# Patient Record
Sex: FEMALE | Race: BLACK OR AFRICAN AMERICAN | Employment: FULL TIME | URBAN - METROPOLITAN AREA
[De-identification: names, ages, dates, MRNs, and addresses within clinical notes are randomized per-mention and may not be internally consistent; named-entity substitution may affect disease eponyms.]

---

## 2017-03-21 PROBLEM — S93.412A SPRAIN OF CALCANEOFIBULAR LIGAMENT OF LEFT ANKLE: Status: ACTIVE | Noted: 2017-03-21

## 2018-06-26 ENCOUNTER — HOSPITAL ENCOUNTER (OUTPATIENT)
Dept: DIABETES SERVICES | Age: 58
Setting detail: THERAPIES SERIES
Discharge: HOME | End: 2018-06-26
Payer: COMMERCIAL

## 2018-06-26 PROCEDURE — G0109 DIAB MANAGE TRN IND/GROUP: HCPCS

## 2018-06-27 ENCOUNTER — HOSPITAL ENCOUNTER (OUTPATIENT)
Dept: DIABETES SERVICES | Age: 58
Setting detail: THERAPIES SERIES
Discharge: HOME | End: 2018-06-27
Payer: COMMERCIAL

## 2018-06-27 PROCEDURE — G0109 DIAB MANAGE TRN IND/GROUP: HCPCS

## 2018-06-28 ENCOUNTER — HOSPITAL ENCOUNTER (OUTPATIENT)
Dept: DIABETES SERVICES | Age: 58
Setting detail: THERAPIES SERIES
Discharge: HOME | End: 2018-06-28
Payer: COMMERCIAL

## 2018-06-28 PROCEDURE — 97804 MEDICAL NUTRITION GROUP: CPT

## 2018-06-28 ASSESSMENT — PATIENT HEALTH QUESTIONNAIRE - PHQ9
7. TROUBLE CONCENTRATING ON THINGS, SUCH AS READING THE NEWSPAPER OR WATCHING TELEVISION: 0
1. LITTLE INTEREST OR PLEASURE IN DOING THINGS: 0
5. POOR APPETITE OR OVEREATING: 3
3. TROUBLE FALLING OR STAYING ASLEEP: 0
SUM OF ALL RESPONSES TO PHQ9 QUESTIONS 1 & 2: 0
6. FEELING BAD ABOUT YOURSELF - OR THAT YOU ARE A FAILURE OR HAVE LET YOURSELF OR YOUR FAMILY DOWN: 0
SUM OF ALL RESPONSES TO PHQ QUESTIONS 1-9: 5
4. FEELING TIRED OR HAVING LITTLE ENERGY: 2
9. THOUGHTS THAT YOU WOULD BE BETTER OFF DEAD, OR OF HURTING YOURSELF: 0
10. IF YOU CHECKED OFF ANY PROBLEMS, HOW DIFFICULT HAVE THESE PROBLEMS MADE IT FOR YOU TO DO YOUR WORK, TAKE CARE OF THINGS AT HOME, OR GET ALONG WITH OTHER PEOPLE: 0
2. FEELING DOWN, DEPRESSED OR HOPELESS: 0
8. MOVING OR SPEAKING SO SLOWLY THAT OTHER PEOPLE COULD HAVE NOTICED. OR THE OPPOSITE, BEING SO FIGETY OR RESTLESS THAT YOU HAVE BEEN MOVING AROUND A LOT MORE THAN USUAL: 0

## 2018-07-07 ENCOUNTER — HOSPITAL ENCOUNTER (OUTPATIENT)
Age: 58
Discharge: HOME | End: 2018-07-07
Payer: COMMERCIAL

## 2018-07-07 LAB
ALBUMIN SERPL-MCNC: 4.1 G/DL (ref 3.5–5.2)
ALP BLD-CCNC: 43 U/L (ref 35–104)
ALT SERPL-CCNC: 9 U/L (ref 0–32)
ANION GAP SERPL CALCULATED.3IONS-SCNC: 13 MMOL/L (ref 7–16)
AST SERPL-CCNC: 11 U/L (ref 0–31)
BASOPHILS ABSOLUTE: 0.02 E9/L (ref 0–0.2)
BASOPHILS RELATIVE PERCENT: 0.7 % (ref 0–2)
BILIRUB SERPL-MCNC: 1 MG/DL (ref 0–1.2)
BUN BLDV-MCNC: 14 MG/DL (ref 6–20)
CALCIUM SERPL-MCNC: 9.3 MG/DL (ref 8.6–10.2)
CHLORIDE BLD-SCNC: 97 MMOL/L (ref 98–107)
CHOLESTEROL, TOTAL: 181 MG/DL (ref 0–199)
CO2: 26 MMOL/L (ref 22–29)
CREAT SERPL-MCNC: 0.7 MG/DL (ref 0.5–1)
EOSINOPHILS ABSOLUTE: 0.03 E9/L (ref 0.05–0.5)
EOSINOPHILS RELATIVE PERCENT: 1.1 % (ref 0–6)
GFR AFRICAN AMERICAN: >60
GFR NON-AFRICAN AMERICAN: >60 ML/MIN/1.73
GLUCOSE BLD-MCNC: 235 MG/DL (ref 74–109)
HCT VFR BLD CALC: 36.1 % (ref 34–48)
HDLC SERPL-MCNC: 55 MG/DL
HEMOGLOBIN: 11.5 G/DL (ref 11.5–15.5)
IMMATURE GRANULOCYTES #: 0.01 E9/L
IMMATURE GRANULOCYTES %: 0.4 % (ref 0–5)
LDL CHOLESTEROL CALCULATED: 112 MG/DL (ref 0–99)
LYMPHOCYTES ABSOLUTE: 0.78 E9/L (ref 1.5–4)
LYMPHOCYTES RELATIVE PERCENT: 28.6 % (ref 20–42)
MCH RBC QN AUTO: 30.9 PG (ref 26–35)
MCHC RBC AUTO-ENTMCNC: 31.9 % (ref 32–34.5)
MCV RBC AUTO: 97 FL (ref 80–99.9)
MONOCYTES ABSOLUTE: 0.22 E9/L (ref 0.1–0.95)
MONOCYTES RELATIVE PERCENT: 8.1 % (ref 2–12)
NEUTROPHILS ABSOLUTE: 1.67 E9/L (ref 1.8–7.3)
NEUTROPHILS RELATIVE PERCENT: 61.1 % (ref 43–80)
PDW BLD-RTO: 12.2 FL (ref 11.5–15)
PLATELET # BLD: 241 E9/L (ref 130–450)
PMV BLD AUTO: 10.6 FL (ref 7–12)
POTASSIUM SERPL-SCNC: 4 MMOL/L (ref 3.5–5)
RBC # BLD: 3.72 E12/L (ref 3.5–5.5)
SODIUM BLD-SCNC: 136 MMOL/L (ref 132–146)
TOTAL PROTEIN: 7 G/DL (ref 6.4–8.3)
TRIGL SERPL-MCNC: 70 MG/DL (ref 0–149)
TSH SERPL DL<=0.05 MIU/L-ACNC: 0.85 UIU/ML (ref 0.27–4.2)
VITAMIN D 25-HYDROXY: 42 NG/ML (ref 30–100)
VLDLC SERPL CALC-MCNC: 14 MG/DL
WBC # BLD: 2.7 E9/L (ref 4.5–11.5)

## 2018-07-07 PROCEDURE — 82306 VITAMIN D 25 HYDROXY: CPT

## 2018-07-07 PROCEDURE — 36415 COLL VENOUS BLD VENIPUNCTURE: CPT

## 2018-07-07 PROCEDURE — 80061 LIPID PANEL: CPT

## 2018-07-07 PROCEDURE — 80053 COMPREHEN METABOLIC PANEL: CPT

## 2018-07-07 PROCEDURE — 85025 COMPLETE CBC W/AUTO DIFF WBC: CPT

## 2018-07-07 PROCEDURE — 84443 ASSAY THYROID STIM HORMONE: CPT

## 2018-08-20 ENCOUNTER — OFFICE VISIT (OUTPATIENT)
Dept: ENDOCRINOLOGY | Age: 58
End: 2018-08-20
Payer: COMMERCIAL

## 2018-08-20 VITALS
HEART RATE: 74 BPM | DIASTOLIC BLOOD PRESSURE: 60 MMHG | WEIGHT: 141.38 LBS | BODY MASS INDEX: 20.94 KG/M2 | SYSTOLIC BLOOD PRESSURE: 120 MMHG | TEMPERATURE: 97.8 F | HEIGHT: 69 IN | OXYGEN SATURATION: 99 % | RESPIRATION RATE: 16 BRPM

## 2018-08-20 DIAGNOSIS — E11.8 TYPE 2 DIABETES MELLITUS WITH COMPLICATION, UNSPECIFIED WHETHER LONG TERM INSULIN USE: ICD-10-CM

## 2018-08-20 DIAGNOSIS — E11.8 TYPE 2 DIABETES MELLITUS WITH COMPLICATION, WITHOUT LONG-TERM CURRENT USE OF INSULIN (HCC): ICD-10-CM

## 2018-08-20 LAB — HBA1C MFR BLD: 11.4 %

## 2018-08-20 PROCEDURE — 99203 OFFICE O/P NEW LOW 30 MIN: CPT | Performed by: INTERNAL MEDICINE

## 2018-08-20 PROCEDURE — 83036 HEMOGLOBIN GLYCOSYLATED A1C: CPT | Performed by: INTERNAL MEDICINE

## 2018-08-20 RX ORDER — GLUCOSAMINE HCL/CHONDROITIN SU 500-400 MG
CAPSULE ORAL
Qty: 200 STRIP | Refills: 5 | Status: SHIPPED | OUTPATIENT
Start: 2018-08-20 | End: 2018-12-31

## 2018-08-20 RX ORDER — GLIMEPIRIDE 2 MG/1
2 TABLET ORAL 2 TIMES DAILY
Qty: 180 TABLET | Refills: 5 | Status: SHIPPED | OUTPATIENT
Start: 2018-08-20 | End: 2019-08-26

## 2018-08-20 RX ORDER — METFORMIN HYDROCHLORIDE 500 MG/1
500 TABLET, FILM COATED, EXTENDED RELEASE ORAL 2 TIMES DAILY WITH MEALS
Qty: 180 TABLET | Refills: 3 | Status: SHIPPED | OUTPATIENT
Start: 2018-08-20 | End: 2018-08-24 | Stop reason: CLARIF

## 2018-08-20 NOTE — PROGRESS NOTES
on file. Social History Main Topics    Smoking status: Never Smoker    Smokeless tobacco: Never Used    Alcohol use 0.0 oz/week      Comment: social    Drug use: No    Sexual activity: Not on file     Other Topics Concern    Not on file     Social History Narrative    No narrative on file     FAMILY HISTORY   Family History   Problem Relation Age of Onset    Thyroid Disease Mother     High Blood Pressure Mother     Arthritis Mother     High Blood Pressure Father     High Blood Pressure Sister     Diabetes Maternal Uncle     Diabetes Paternal Aunt     Diabetes Maternal Grandmother     Cancer Maternal Grandfather     Thyroid Disease Sister      ALLERGIES AND DRUG REACTIONS   Allergies   Allergen Reactions    Sulfa Antibiotics Itching       CURRENT MEDICATIONS     Current Outpatient Prescriptions   Medication Sig Dispense Refill    SITagliptin (JANUVIA) 100 MG tablet Take 1 tablet by mouth daily 90 tablet 5    glimepiride (AMARYL) 2 MG tablet Take 1 tablet by mouth 2 times daily 180 tablet 5    blood glucose monitor strips Test 2 times a day & as needed for symptoms of irregular blood glucose. 200 strip 5    levETIRAcetam (KEPPRA) 750 MG tablet Take 1 tablet by mouth 2 times daily 180 tablet 2    Cyanocobalamin (VITAMIN B-12) 2000 MCG TBCR Take 1 tablet by mouth daily       vitamin D (ERGOCALCIFEROL) 73783 UNITS CAPS capsule Take 50,000 Units by mouth once a week Takes on fridays      GLUMETZA 1000 MG ER tablet TAKE 1 TABLET TWICE A DAY WITH MEALS 180 tablet 0    glimepiride (AMARYL) 2 MG tablet Take 2 tablets twice daily 180 tablet 0    naproxen (NAPROSYN) 500 MG tablet Take 1 tablet by mouth 2 times daily 60 tablet 0    CLIMARA 0.075 MG/24HR Place 1 patch onto the skin once a week Applies on wednesdays      ibuprofen (ADVIL;MOTRIN) 800 MG tablet Take 1 tablet by mouth every 6 hours as needed for Pain 20 tablet 3     No current facility-administered medications for this visit. eating consistent carb diet to avoid blood sugar fluctuations   · Advised to check blood sugars 2 times a day before meals and fax the results to our office in few days   · Patient up to date with the routine diabetes maintenance and prevention  · Discussed lifestyle changes including diet and exercise with patient; recommended 150 minutes of moderate intensity exercise per week. · Diabetes labs before next visit     Follow up  · 3 months     The above issues were reviewed with the patient who understood and agreed with the plan. 30 minutes were spent today in management of this patient. More than 50% of time spent on counseling of patient on above diagnosis. Thank you for allowing us to participate in the care of this patient. Please do not hesitate to contact us with any additional questions.      Danni Gamino MD  Endocrinologist, [address] [address]   Phone: [phone]  Fax: [phone]  --------------------------------------------  Electronically signed

## 2018-08-20 NOTE — PATIENT INSTRUCTIONS
Recommendations for today's visit  · Continue current dose of Metformin Januvia and Glimepiride   · Start Basaglar 8 units daily   · Check blood sugar twice a day and send us sugar log later this week     I you have any questions please call Dr. Tyler Vargas office     Macrina Ulrich MD  Endocrinologist, [facility])   [address] [facility], [address] [place] [address],[facility] [id number]   Phone: [phone]  Fax: [phone]

## 2018-08-24 DIAGNOSIS — E11.8 TYPE 2 DIABETES MELLITUS WITH COMPLICATION, WITHOUT LONG-TERM CURRENT USE OF INSULIN (HCC): Primary | ICD-10-CM

## 2018-10-11 ENCOUNTER — TELEPHONE (OUTPATIENT)
Dept: ENDOCRINOLOGY | Age: 58
End: 2018-10-11

## 2018-11-17 ENCOUNTER — HOSPITAL ENCOUNTER (OUTPATIENT)
Age: 58
Discharge: HOME | End: 2018-11-17
Payer: COMMERCIAL

## 2018-11-17 DIAGNOSIS — E11.8 TYPE 2 DIABETES MELLITUS WITH COMPLICATION, UNSPECIFIED WHETHER LONG TERM INSULIN USE: ICD-10-CM

## 2018-11-17 LAB
ANION GAP SERPL CALCULATED.3IONS-SCNC: 13 MMOL/L (ref 7–16)
BUN BLDV-MCNC: 14 MG/DL (ref 6–20)
CALCIUM SERPL-MCNC: 8.9 MG/DL (ref 8.6–10.2)
CHLORIDE BLD-SCNC: 101 MMOL/L (ref 98–107)
CO2: 26 MMOL/L (ref 22–29)
CREAT SERPL-MCNC: 0.6 MG/DL (ref 0.5–1)
CREATININE URINE: 150 MG/DL (ref 29–226)
GFR AFRICAN AMERICAN: >60
GFR NON-AFRICAN AMERICAN: >60 ML/MIN/1.73
GLUCOSE BLD-MCNC: 181 MG/DL (ref 74–99)
HBA1C MFR BLD: 9.2 % (ref 4–5.6)
MICROALBUMIN UR-MCNC: <12 MG/L
MICROALBUMIN/CREAT UR-RTO: ABNORMAL (ref 0–30)
POTASSIUM SERPL-SCNC: 4 MMOL/L (ref 3.5–5)
SODIUM BLD-SCNC: 140 MMOL/L (ref 132–146)

## 2018-11-17 PROCEDURE — 83036 HEMOGLOBIN GLYCOSYLATED A1C: CPT

## 2018-11-17 PROCEDURE — 82044 UR ALBUMIN SEMIQUANTITATIVE: CPT

## 2018-11-17 PROCEDURE — 82570 ASSAY OF URINE CREATININE: CPT

## 2018-11-17 PROCEDURE — 36415 COLL VENOUS BLD VENIPUNCTURE: CPT

## 2018-11-17 PROCEDURE — 80048 BASIC METABOLIC PNL TOTAL CA: CPT

## 2018-12-31 ENCOUNTER — OFFICE VISIT (OUTPATIENT)
Dept: ENDOCRINOLOGY | Age: 58
End: 2018-12-31
Payer: COMMERCIAL

## 2018-12-31 VITALS
BODY MASS INDEX: 22.51 KG/M2 | RESPIRATION RATE: 16 BRPM | OXYGEN SATURATION: 98 % | DIASTOLIC BLOOD PRESSURE: 80 MMHG | WEIGHT: 152 LBS | SYSTOLIC BLOOD PRESSURE: 148 MMHG | HEIGHT: 69 IN | HEART RATE: 85 BPM

## 2018-12-31 DIAGNOSIS — E11.9 TYPE 2 DIABETES MELLITUS WITHOUT COMPLICATION, UNSPECIFIED WHETHER LONG TERM INSULIN USE (HCC): Primary | ICD-10-CM

## 2018-12-31 PROCEDURE — 99214 OFFICE O/P EST MOD 30 MIN: CPT | Performed by: INTERNAL MEDICINE

## 2018-12-31 RX ORDER — GLUCOSAMINE HCL/CHONDROITIN SU 500-400 MG
CAPSULE ORAL
Qty: 200 STRIP | Refills: 5 | Status: SHIPPED | OUTPATIENT
Start: 2018-12-31 | End: 2019-03-01 | Stop reason: SDUPTHER

## 2018-12-31 NOTE — LETTER
[address] [place] [address] Department of Endocrinology Diabetes and Metabolism       Provider: Eileen Dangelo MD  [address] [facility], [address] [facility],[address] [address]   Phone: [phone]  Fax: [phone]    Primary Care Physician: Kaylah Melo MD   Referring Provider: No ref. provider found    Patient: Ebony Oneal  YOB: 1960  Date of Visit: 12/31/2018      Dear Dr. Kaylah Melo MD   I had the pleasure of seeing your patient Ebony Oneal today at endocrine clinic for follow up visit and I enclosed a copy of the office visit completed today. Thank you very much for asking us to participate in the care of this very pleasant patient. Please don't hesitate to call if there are any further questions or concerns. Sincerely   Eileen Dangelo MD  Endocrinologist, [facility]   [address] [facility], [address] [facility],[address] [address]   Phone: [phone]  Fax: [phone]      ENDOCRINOLOGY CLINIC NOTE    Date of Service: 12/31/2018    Medical Records Reviewed:   Inpatient records, outpatient records, outside records     Care Team:  Primary Care Physician: Kaylah Melo MD.  Provider: Eileen Dangelo MD  Other provider(s):            Reason for the visit:  Uncontrolled type II DM      Type of Visit:  Follow up     History of Present Illness: The history is provided by the patient. No  was used. Accuracy of the patient data is excellent. Ebony Oneal is a very pleasant 60 y.o. female seen in Endocrine clinic today for diabetes management     Ebony Oneal was diagnosed with diabetes at age 47   Currently on Januvia 100 mg daily, Metformin 500 mg BID, Glimepiride 2 mg BID, basaglar 8 units daily    The patient has been checking blood sugar daily in the morning. Usual readings in the morning 200s   A1c now 9.2 down from 11.2%  Checks BS once a day in the morning.  Readings usally 1120-180   Patient has had no hypoglycemic episodes Adult)   Pulse 85   Resp 16   Ht 5' 9\" (1.753 m)   Wt 152 lb (68.9 kg)   SpO2 98%   BMI 22.45 kg/m²    BP Readings from Last 4 Encounters:   12/31/18 (!) 148/80   08/20/18 120/60   04/11/17 (!) 115/58   03/21/17 (!) 95/58     Wt Readings from Last 6 Encounters:   12/31/18 152 lb (68.9 kg)   08/20/18 141 lb 6 oz (64.1 kg)   11/02/17 154 lb (69.9 kg)   05/01/17 157 lb (71.2 kg)   04/11/17 155 lb (70.3 kg)   03/21/17 155 lb (70.3 kg)       Physical examination:  General: awake alert, oriented x3, no abnormal position or movements. HEENT: normocephalic non-traumatic, no exophthalmos   Neck: supple, no LN enlargement, no thyromegaly, no thyroid tenderness, no JVD. Pulm: Clear equal air entry no added sounds, no wheezing or rhonchi    CVS: S1 + S2, no murmur, no heave. Dorsalis pedis pulse palpable   Abd: soft lax, no tenderness, no organomegaly, audible bowel sounds. Skin: warm, no lesions, no rash.  No callus, no Ulcers, No acanthosis nigricans   Neuro: CN intact, Monofilament sensation decreased bilateral , muscle power normal  Psych: normal mood, and affect      Review of Laboratory Data:  I have reviewed the following:  Lab Results   Component Value Date/Time    WBC 2.7 (L) 07/07/2018 10:51 AM    RBC 3.72 07/07/2018 10:51 AM    HGB 11.5 07/07/2018 10:51 AM    HCT 36.1 07/07/2018 10:51 AM    MCV 97.0 07/07/2018 10:51 AM    MCH 30.9 07/07/2018 10:51 AM    MCHC 31.9 (L) 07/07/2018 10:51 AM    RDW 12.2 07/07/2018 10:51 AM     07/07/2018 10:51 AM    MPV 10.6 07/07/2018 10:51 AM      Lab Results   Component Value Date/Time     11/17/2018 09:55 AM    K 4.0 11/17/2018 09:55 AM    CO2 26 11/17/2018 09:55 AM    BUN 14 11/17/2018 09:55 AM    CALCIUM 8.9 11/17/2018 09:55 AM      Lab Results   Component Value Date    LABA1C 9.2 11/17/2018    GLUCOSE 181 11/17/2018    MALBCR - 11/17/2018    LABMICR <12.0 11/17/2018    LABCREA 150 11/17/2018     Lab Results   Component Value Date    CHOL 181 07/07/2018 CHOL 142 01/23/2017    CHOL 163 07/15/2016    CHOL 167 10/03/2015    TRIG 70 07/07/2018    TRIG 60 01/23/2017    TRIG 54 07/15/2016    TRIG 66 10/03/2015    HDL 55 07/07/2018    HDL 48 01/23/2017    HDL 60 07/15/2016    HDL 54 10/03/2015     Lab Results   Component Value Date    VITD25 42 07/07/2018    VITD25 36 01/23/2017    VITD25 40 07/15/2016    VITD25 20 10/03/2015       All labs medical records and images were reviewed independently     Additional Data Reviewed: Individual visualization of point-of-care blood glucose levels and medications doses    ASSESSMENT & RECOMMENDATIONS   Manju Santana, a 60 y.o.-old female seen in for the following issues     Diabetes Mellitus Type 2    · Improving control but still above goal   · Will increase Metformin to 1000 mg BID  · Continue Januvia 100 mg daily, Glimepiride 2 mg BID, basaglar 8 units daily  · Advised to check blood sugars 2-3 times a day before meals and fax the results to our office in a wk. At that time will likely increase Glimepiride dose and consider stopping Basaglar   · Discussed with patient A1c and blood sugar goals   · Optimal blood sugars: 100-140 pre-prandial, < 180 peak post-prandial  · The patient counseled about the complications of uncontrolled diabetes   · Patient was counselled about the importance of self-blood glucose monitoring and eating consistent carb diet to avoid blood sugar fluctuations   · Patient up to date with the routine diabetes maintenance and prevention  · Discussed lifestyle changes including diet and exercise with patient; recommended 150 minutes of moderate intensity exercise per week. · Diabetes labs before next visit     Follow up  · 4 months     The above issues were reviewed with the patient who understood and agreed with the plan. 30 minutes were spent today in management of this patient. More than 50% of time spent on counseling of patient on above diagnosis.

## 2019-03-01 RX ORDER — GLUCOSAMINE HCL/CHONDROITIN SU 500-400 MG
CAPSULE ORAL
Qty: 200 STRIP | Refills: 5 | Status: SHIPPED | OUTPATIENT
Start: 2019-03-01

## 2019-04-27 ENCOUNTER — HOSPITAL ENCOUNTER (OUTPATIENT)
Age: 59
Discharge: HOME | End: 2019-04-27
Payer: COMMERCIAL

## 2019-04-27 DIAGNOSIS — E11.9 TYPE 2 DIABETES MELLITUS WITHOUT COMPLICATION, UNSPECIFIED WHETHER LONG TERM INSULIN USE (HCC): ICD-10-CM

## 2019-04-27 LAB
ANION GAP SERPL CALCULATED.3IONS-SCNC: 11 MMOL/L (ref 7–16)
BUN BLDV-MCNC: 10 MG/DL (ref 6–20)
CALCIUM SERPL-MCNC: 9.5 MG/DL (ref 8.6–10.2)
CHLORIDE BLD-SCNC: 104 MMOL/L (ref 98–107)
CHOLESTEROL, TOTAL: 156 MG/DL (ref 0–199)
CO2: 28 MMOL/L (ref 22–29)
CREAT SERPL-MCNC: 0.7 MG/DL (ref 0.5–1)
GFR AFRICAN AMERICAN: >60
GFR NON-AFRICAN AMERICAN: >60 ML/MIN/1.73
GLUCOSE BLD-MCNC: 75 MG/DL (ref 74–99)
HBA1C MFR BLD: 7.6 % (ref 4–5.6)
HDLC SERPL-MCNC: 63 MG/DL
LDL CHOLESTEROL CALCULATED: 84 MG/DL (ref 0–99)
MICROALBUMIN UR-MCNC: <12 MG/L
POTASSIUM SERPL-SCNC: 3.9 MMOL/L (ref 3.5–5)
SODIUM BLD-SCNC: 143 MMOL/L (ref 132–146)
TRIGL SERPL-MCNC: 46 MG/DL (ref 0–149)
VLDLC SERPL CALC-MCNC: 9 MG/DL

## 2019-04-27 PROCEDURE — 80048 BASIC METABOLIC PNL TOTAL CA: CPT

## 2019-04-27 PROCEDURE — 83036 HEMOGLOBIN GLYCOSYLATED A1C: CPT

## 2019-04-27 PROCEDURE — 80061 LIPID PANEL: CPT

## 2019-04-27 PROCEDURE — 82044 UR ALBUMIN SEMIQUANTITATIVE: CPT

## 2019-04-27 PROCEDURE — 36415 COLL VENOUS BLD VENIPUNCTURE: CPT

## 2019-04-29 ENCOUNTER — OFFICE VISIT (OUTPATIENT)
Dept: ENDOCRINOLOGY | Age: 59
End: 2019-04-29
Payer: COMMERCIAL

## 2019-04-29 VITALS
OXYGEN SATURATION: 98 % | BODY MASS INDEX: 24.41 KG/M2 | HEIGHT: 69 IN | WEIGHT: 164.8 LBS | SYSTOLIC BLOOD PRESSURE: 118 MMHG | DIASTOLIC BLOOD PRESSURE: 78 MMHG | HEART RATE: 76 BPM | RESPIRATION RATE: 16 BRPM

## 2019-04-29 DIAGNOSIS — E11.9 TYPE 2 DIABETES MELLITUS WITHOUT COMPLICATION, UNSPECIFIED WHETHER LONG TERM INSULIN USE (HCC): Primary | ICD-10-CM

## 2019-04-29 DIAGNOSIS — E55.9 VITAMIN D DEFICIENCY: ICD-10-CM

## 2019-04-29 PROCEDURE — 83036 HEMOGLOBIN GLYCOSYLATED A1C: CPT | Performed by: INTERNAL MEDICINE

## 2019-04-29 PROCEDURE — 99214 OFFICE O/P EST MOD 30 MIN: CPT | Performed by: INTERNAL MEDICINE

## 2019-04-29 RX ORDER — ERGOCALCIFEROL 1.25 MG/1
50000 CAPSULE ORAL WEEKLY
Qty: 5 CAPSULE | Refills: 5 | Status: SHIPPED | OUTPATIENT
Start: 2019-04-29 | End: 2019-04-29 | Stop reason: SDUPTHER

## 2019-04-29 RX ORDER — ERGOCALCIFEROL 1.25 MG/1
50000 CAPSULE ORAL WEEKLY
Qty: 12 CAPSULE | Refills: 5 | Status: SHIPPED | OUTPATIENT
Start: 2019-04-29 | End: 2020-01-06 | Stop reason: SDUPTHER

## 2019-04-29 NOTE — LETTER
[address] [place] [address] Department of Endocrinology Diabetes and Metabolism       Provider: Rina Snowden MD  [address] [facility], [address] [facility],[address] [address]   Phone: [phone]  Fax: [phone]    Primary Care Physician: Tito Lama MD   Referring Provider: No ref. provider found    Patient: Silvia Beth  YOB: 1960  Date of Visit: 4/29/2019      Dear Dr. Tito Lama MD   I had the pleasure of seeing your patient Silvia Beth today at endocrine clinic for follow up visit and I enclosed a copy of the office visit completed today. Thank you very much for asking us to participate in the care of this very pleasant patient. Please don't hesitate to call if there are any further questions or concerns. Sincerely   Rina Snowden MD  Endocrinologist, [facility])   [address] [facility], [address] [facility],[address] [address]   Phone: [phone]  Fax: [phone]      ENDOCRINOLOGY CLINIC NOTE    Date of Service: 4/29/2019    Medical Records Reviewed:   I personally reviewed and summarized previous records     Care Team:  Primary Care Physician: Tito Lama MD.  Provider: Rina Snowden MD  Other provider(s):            Reason for the visit:  DM type 2     History of Present Illness: The history is provided by the patient. No  was used. Accuracy of the patient data is excellent. Silvia Beth is a very pleasant 60 y.o. female seen in Endocrine clinic today for diabetes management     Silvia Beth was diagnosed with diabetes at age 47  and currently on Januvia 100 mg daily, Metformin 500 mg BID, Glimepiride 2 mg BID, basaglar 8 units daily    The patient has been checking blood sugar daily in the morning.  Usual readings in the morning 200s   Lab Results   Component Value Date    LABA1C 7.6 04/27/2019    LABA1C 9.2 11/17/2018    LABA1C 11.4 08/20/2018     Checks BS once a day in the morning and most readings at goal  Patient has had no hypoglycemic episodes Had diabetes class in the past and has been mindful of what has been eating and follow diabetes diet as encouraged   I reviewed current medications and the patient has no issues with diabetes medications  The patient is up to date with eye exam and denied any h/o diabetic retinopathy. She is seeing podiatrist every 3 months and also performs her own foot care. Last seen was last month   Microvascular complications:  No Retinopathy, Nephropathy or Neuropathy   Macrovascular complications: no CAD, PVD, or Stroke  Refuses Flushot      PAST MEDICAL HISTORY   Past Medical History:   Diagnosis Date    Seizures (HCC)     Type II or unspecified type diabetes mellitus without mention of complication, not stated as uncontrolled     Unspecified sleep apnea     stopped after weight loss     PAST SURGICAL HISTORY   Past Surgical History:   Procedure Laterality Date    HYSTERECTOMY       SOCIAL HISTORY   Social History     Socioeconomic History    Marital status:      Spouse name: Not on file    Number of children: Not on file    Years of education: 15    Highest education level: Not on file   Occupational History    Not on file   Social Needs    Financial resource strain: Not on file    Food insecurity:     Worry: Not on file     Inability: Not on file    Transportation needs:     Medical: Not on file     Non-medical: Not on file   Tobacco Use    Smoking status: Never Smoker    Smokeless tobacco: Never Used   Substance and Sexual Activity    Alcohol use:  Yes     Alcohol/week: 0.0 oz     Comment: social    Drug use: No    Sexual activity: Not on file   Lifestyle    Physical activity:     Days per week: Not on file     Minutes per session: Not on file    Stress: Not on file   Relationships    Social connections:     Talks on phone: Not on file     Gets together: Not on file     Attends Yarsani service: Not on file     Active member of club or organization: Not on file Attends meetings of clubs or organizations: Not on file     Relationship status: Not on file    Intimate partner violence:     Fear of current or ex partner: Not on file     Emotionally abused: Not on file     Physically abused: Not on file     Forced sexual activity: Not on file   Other Topics Concern    Not on file   Social History Narrative    Not on file     FAMILY HISTORY   Family History   Problem Relation Age of Onset    Thyroid Disease Mother     High Blood Pressure Mother     Arthritis Mother     High Blood Pressure Father     High Blood Pressure Sister     Diabetes Maternal Uncle     Diabetes Paternal Aunt     Diabetes Maternal Grandmother     Cancer Maternal Grandfather     Thyroid Disease Sister      ALLERGIES AND DRUG REACTIONS   Allergies   Allergen Reactions    Sulfa Antibiotics Itching       CURRENT MEDICATIONS     Current Outpatient Medications   Medication Sig Dispense Refill    vitamin D (ERGOCALCIFEROL) 32894 units CAPS capsule Take 1 capsule by mouth once a week Takes on fridays 5 capsule 5    Insulin Pen Needle (BD PEN NEEDLE JEFFERY U/F) 32G X 4 MM MISC 1 each by Does not apply route daily 100 each 3    blood glucose monitor strips One-Touch Ultra strips. Test 2  times a day 200 strip 5    metFORMIN (GLUCOPHAGE) 1000 MG tablet Take 1 tablet by mouth 2 times daily (with meals) 180 tablet 5    insulin glargine (BASAGLAR KWIKPEN) 100 UNIT/ML injection pen Inject 8 Units into the skin daily 5 pen 5    SITagliptin (JANUVIA) 100 MG tablet Take 1 tablet by mouth daily 90 tablet 5    glimepiride (AMARYL) 2 MG tablet Take 1 tablet by mouth 2 times daily 180 tablet 5    levETIRAcetam (KEPPRA) 750 MG tablet Take 1 tablet by mouth 2 times daily 180 tablet 2     No current facility-administered medications for this visit. Review of Systems  Constitutional: No fever, no chills, no diaphoresis, no generalized weakness. MCH 30.9 07/07/2018 10:51 AM    MCHC 31.9 (L) 07/07/2018 10:51 AM    RDW 12.2 07/07/2018 10:51 AM     07/07/2018 10:51 AM    MPV 10.6 07/07/2018 10:51 AM      Lab Results   Component Value Date/Time     04/27/2019 10:19 AM    K 3.9 04/27/2019 10:19 AM    CO2 28 04/27/2019 10:19 AM    BUN 10 04/27/2019 10:19 AM    CALCIUM 9.5 04/27/2019 10:19 AM      Lab Results   Component Value Date    LABA1C 7.6 04/27/2019    GLUCOSE 75 04/27/2019    MALBCR - 11/17/2018    LABMICR <12.0 04/27/2019    LABCREA 150 11/17/2018     Lab Results   Component Value Date    CHOL 156 04/27/2019    CHOL 181 07/07/2018    CHOL 142 01/23/2017    CHOL 163 07/15/2016    TRIG 46 04/27/2019    TRIG 70 07/07/2018    TRIG 60 01/23/2017    TRIG 54 07/15/2016    HDL 63 04/27/2019    HDL 55 07/07/2018    HDL 48 01/23/2017    HDL 60 07/15/2016     Lab Results   Component Value Date    VITD25 42 07/07/2018    VITD25 36 01/23/2017    VITD25 40 07/15/2016    VITD25 20 10/03/2015     Medical Records/Labs/Images review:   I personally reviewed and summarized previous records   All labs were reviewed independently     Israel Kasper, a 60 y.o.-old female seen in for the following issues     Diabetes Mellitus Type 2    · Improving control, A1c 7.6%   · Continue  Januvia 100 mg daily, Metformin 500 mg BID, Glimepiride 2 mg BID, basaglar 8 units daily    · Advised to check blood sugars 2-3 times a day before meals and fax the results to our office in a wk.  At that time will likely increase Glimepiride dose and consider stopping Basaglar   · Discussed with patient A1c and blood sugar goals   · Optimal blood sugars: 100-140 pre-prandial, < 180 peak post-prandial  · The patient counseled about the complications of uncontrolled diabetes   · Patient was counselled about the importance of self-blood glucose monitoring and eating consistent carb diet to avoid blood sugar fluctuations · Patient up to date with the routine diabetes maintenance and prevention  · Discussed lifestyle changes including diet and exercise with patient; recommended 150 minutes of moderate intensity exercise per week. · Diabetes labs before next visit     vitD deficiency   · Continue weekly vitD supplements     Return in about 4 months (around 8/29/2019) for DM type 2 . The above issues were reviewed with the patient who understood and agreed with the plan. 30 minutes were spent today in management of this patient. More than 50% of time spent on counseling of patient on above diagnosis. Thank you for allowing us to participate in the care of this patient. Please do not hesitate to contact us with any additional questions. Diagnosis Orders   1. Type 2 diabetes mellitus without complication, unspecified whether long term insulin use (HCC)  POCT glycosylated hemoglobin (Hb A1C)   2.  Vitamin D deficiency  vitamin D (ERGOCALCIFEROL) 58653 units CAPS capsule       Rubio Sutton MD  Endocrinologist, [facility])   [address] [place] [address], [address] [place] [address],[facility] [address]   Phone: [phone]  Fax: [phone]  --------------------------------------------  Electronically signed

## 2019-04-29 NOTE — PROGRESS NOTES
ENDOCRINOLOGY CLINIC NOTE    Date of Service: 4/29/2019    Medical Records Reviewed:   I personally reviewed and summarized previous records     Care Team:  Primary Care Physician: Dragan De La Rosa MD.  Provider: Andrew Sorensen MD  Other provider(s):            Reason for the visit:  DM type 2     History of Present Illness: The history is provided by the patient. No  was used. Accuracy of the patient data is excellent. Aj Roy is a very pleasant 60 y.o. female seen in Endocrine clinic today for diabetes management     Aj Roy was diagnosed with diabetes at age 47  and currently on Januvia 100 mg daily, Metformin 500 mg BID, Glimepiride 2 mg BID, basaglar 8 units daily    The patient has been checking blood sugar daily in the morning. Usual readings in the morning 200s   Lab Results   Component Value Date    LABA1C 7.6 04/27/2019    LABA1C 9.2 11/17/2018    LABA1C 11.4 08/20/2018     Checks BS once a day in the morning and most readings at goal  Patient has had no hypoglycemic episodes   Had diabetes class in the past and has been mindful of what has been eating and follow diabetes diet as encouraged   I reviewed current medications and the patient has no issues with diabetes medications  The patient is up to date with eye exam and denied any h/o diabetic retinopathy. She is seeing podiatrist every 3 months and also performs her own foot care.  Last seen was last month   Microvascular complications:  No Retinopathy, Nephropathy or Neuropathy   Macrovascular complications: no CAD, PVD, or Stroke  Refuses Flushot      PAST MEDICAL HISTORY   Past Medical History:   Diagnosis Date    Seizures ([address]     Type II or unspecified type diabetes mellitus without mention of complication, not stated as uncontrolled     Unspecified sleep apnea     stopped after weight loss     PAST SURGICAL HISTORY   Past Surgical History:   Procedure Laterality Date    HYSTERECTOMY       SOCIAL HISTORY Social History     Socioeconomic History    Marital status:      Spouse name: Not on file    Number of children: Not on file    Years of education: 15    Highest education level: Not on file   Occupational History    Not on file   Social Needs    Financial resource strain: Not on file    Food insecurity:     Worry: Not on file     Inability: Not on file    Transportation needs:     Medical: Not on file     Non-medical: Not on file   Tobacco Use    Smoking status: Never Smoker    Smokeless tobacco: Never Used   Substance and Sexual Activity    Alcohol use:  Yes     Alcohol/week: 0.0 oz     Comment: social    Drug use: No    Sexual activity: Not on file   Lifestyle    Physical activity:     Days per week: Not on file     Minutes per session: Not on file    Stress: Not on file   Relationships    Social connections:     Talks on phone: Not on file     Gets together: Not on file     Attends Mu-ism service: Not on file     Active member of club or organization: Not on file     Attends meetings of clubs or organizations: Not on file     Relationship status: Not on file    Intimate partner violence:     Fear of current or ex partner: Not on file     Emotionally abused: Not on file     Physically abused: Not on file     Forced sexual activity: Not on file   Other Topics Concern    Not on file   Social History Narrative    Not on file     FAMILY HISTORY   Family History   Problem Relation Age of Onset    Thyroid Disease Mother     High Blood Pressure Mother     Arthritis Mother     High Blood Pressure Father     High Blood Pressure Sister     Diabetes Maternal Uncle     Diabetes Paternal Aunt     Diabetes Maternal Grandmother     Cancer Maternal Grandfather     Thyroid Disease Sister      ALLERGIES AND DRUG REACTIONS   Allergies   Allergen Reactions    Sulfa Antibiotics Itching       CURRENT MEDICATIONS     Current Outpatient Medications   Medication Sig Dispense Refill    vitamin D (ERGOCALCIFEROL) 46617 units CAPS capsule Take 1 capsule by mouth once a week Takes on fridays 5 capsule 5    Insulin Pen Needle (BD PEN NEEDLE JEFFERY U/F) 32G X 4 MM MISC 1 each by Does not apply route daily 100 each 3    blood glucose monitor strips One-Touch Ultra strips. Test 2  times a day 200 strip 5    metFORMIN (GLUCOPHAGE) 1000 MG tablet Take 1 tablet by mouth 2 times daily (with meals) 180 tablet 5    insulin glargine (BASAGLAR KWIKPEN) 100 UNIT/ML injection pen Inject 8 Units into the skin daily 5 pen 5    SITagliptin (JANUVIA) 100 MG tablet Take 1 tablet by mouth daily 90 tablet 5    glimepiride (AMARYL) 2 MG tablet Take 1 tablet by mouth 2 times daily 180 tablet 5    levETIRAcetam (KEPPRA) 750 MG tablet Take 1 tablet by mouth 2 times daily 180 tablet 2     No current facility-administered medications for this visit. Review of Systems  Constitutional: No fever, no chills, no diaphoresis, no generalized weakness. HEENT: No blurred vision, No sore throat, no ear pain, no hair loss  Neck: denied any neck swelling, difficulty swallowing,   Cardio-pulmonary: No CP, SOB or palpitation, No orthopnea or PND. No cough or wheezing. GI: No N/V/D, no constipation, No abdominal pain, no melena or hematochezia   : Denied any dysuria, hematuria, flank pain, discharge, or incontinence. Skin: denied any rash, ulcer, Hirsute, or hyperpigmentation. MSK: denied any joint deformity, joint pain/swelling, muscle pain, or back pain.   Neuro: no numbness, no tingling, no weakness, _  OBJECTIVE    /78   Pulse 76   Resp 16   Ht 5' 9\" (1.753 m)   Wt 164 lb 12.8 oz (74.8 kg)   SpO2 98%   BMI 24.34 kg/m²   BP Readings from Last 4 Encounters:   04/29/19 118/78   12/31/18 (!) 148/80   08/20/18 120/60   04/11/17 (!) 115/58     Wt Readings from Last 6 Encounters:   04/29/19 164 lb 12.8 oz (74.8 kg)   12/31/18 152 lb (68.9 kg)   08/20/18 141 lb 6 oz (64.1 kg)   11/02/17 154 lb (69.9 kg)   05/01/17 157 lb (71.2 kg)   04/11/17 155 lb (70.3 kg)       Physical examination:  General: awake alert, oriented x3, no abnormal position or movements. HEENT: normocephalic non-traumatic, no exophthalmos   Neck: supple, no LN enlargement, no thyromegaly, no thyroid tenderness, no JVD. Pulm: Clear equal air entry no added sounds, no wheezing or rhonchi    CVS: S1 + S2, no murmur, no heave. Dorsalis pedis pulse palpable   Abd: soft lax, no tenderness, no organomegaly, audible bowel sounds. Skin: warm, no lesions, no rash.  No callus, no Ulcers, No acanthosis nigricans   Neuro: CN intact, Monofilament sensation decreased bilateral , muscle power normal  Psych: normal mood, and affect      Review of Laboratory Data:  I have reviewed the following:  Lab Results   Component Value Date/Time    WBC 2.7 (L) 07/07/2018 10:51 AM    RBC 3.72 07/07/2018 10:51 AM    HGB 11.5 07/07/2018 10:51 AM    HCT 36.1 07/07/2018 10:51 AM    MCV 97.0 07/07/2018 10:51 AM    MCH 30.9 07/07/2018 10:51 AM    MCHC 31.9 (L) 07/07/2018 10:51 AM    RDW 12.2 07/07/2018 10:51 AM     07/07/2018 10:51 AM    MPV 10.6 07/07/2018 10:51 AM      Lab Results   Component Value Date/Time     04/27/2019 10:19 AM    K 3.9 04/27/2019 10:19 AM    CO2 28 04/27/2019 10:19 AM    BUN 10 04/27/2019 10:19 AM    CALCIUM 9.5 04/27/2019 10:19 AM      Lab Results   Component Value Date    LABA1C 7.6 04/27/2019    GLUCOSE 75 04/27/2019    MALBCR - 11/17/2018    LABMICR <12.0 04/27/2019    LABCREA 150 11/17/2018     Lab Results   Component Value Date    CHOL 156 04/27/2019    CHOL 181 07/07/2018    CHOL 142 01/23/2017    CHOL 163 07/15/2016    TRIG 46 04/27/2019    TRIG 70 07/07/2018    TRIG 60 01/23/2017    TRIG 54 07/15/2016    HDL 63 04/27/2019    HDL 55 07/07/2018    HDL 48 01/23/2017    HDL 60 07/15/2016     Lab Results   Component Value Date    VITD25 42 07/07/2018    VITD25 36 01/23/2017    VITD25 40 07/15/2016    VITD25 20 10/03/2015     Medical Records/Labs/Images [address] [place] [address]   Phone: [phone]  Fax: [phone]  --------------------------------------------  Electronically signed

## 2019-05-08 DIAGNOSIS — E11.8 TYPE 2 DIABETES MELLITUS WITH COMPLICATION, UNSPECIFIED WHETHER LONG TERM INSULIN USE: ICD-10-CM

## 2019-08-21 ENCOUNTER — HOSPITAL ENCOUNTER (OUTPATIENT)
Age: 59
Discharge: HOME | End: 2019-08-21
Payer: COMMERCIAL

## 2019-08-21 LAB
CHOLESTEROL, TOTAL: 175 MG/DL (ref 0–199)
CREATININE URINE: 156 MG/DL (ref 29–226)
HDLC SERPL-MCNC: 57 MG/DL
LDL CHOLESTEROL CALCULATED: 104 MG/DL (ref 0–99)
MICROALBUMIN UR-MCNC: <12 MG/L
MICROALBUMIN/CREAT UR-RTO: ABNORMAL (ref 0–30)
TRIGL SERPL-MCNC: 72 MG/DL (ref 0–149)
VLDLC SERPL CALC-MCNC: 14 MG/DL

## 2019-08-21 PROCEDURE — 80061 LIPID PANEL: CPT

## 2019-08-21 PROCEDURE — 82570 ASSAY OF URINE CREATININE: CPT

## 2019-08-21 PROCEDURE — 82044 UR ALBUMIN SEMIQUANTITATIVE: CPT

## 2019-08-21 PROCEDURE — 36415 COLL VENOUS BLD VENIPUNCTURE: CPT

## 2019-08-26 ENCOUNTER — OFFICE VISIT (OUTPATIENT)
Dept: ENDOCRINOLOGY | Age: 59
End: 2019-08-26
Payer: COMMERCIAL

## 2019-08-26 VITALS
RESPIRATION RATE: 16 BRPM | OXYGEN SATURATION: 97 % | SYSTOLIC BLOOD PRESSURE: 138 MMHG | HEIGHT: 69 IN | DIASTOLIC BLOOD PRESSURE: 84 MMHG | HEART RATE: 83 BPM | BODY MASS INDEX: 24.17 KG/M2 | WEIGHT: 163.2 LBS

## 2019-08-26 DIAGNOSIS — E11.8 TYPE 2 DIABETES MELLITUS WITH COMPLICATION, UNSPECIFIED WHETHER LONG TERM INSULIN USE: Primary | ICD-10-CM

## 2019-08-26 LAB — HBA1C MFR BLD: 8.1 %

## 2019-08-26 PROCEDURE — 83036 HEMOGLOBIN GLYCOSYLATED A1C: CPT | Performed by: INTERNAL MEDICINE

## 2019-08-26 PROCEDURE — 99214 OFFICE O/P EST MOD 30 MIN: CPT | Performed by: INTERNAL MEDICINE

## 2019-08-26 RX ORDER — GLIMEPIRIDE 4 MG/1
TABLET ORAL
Qty: 180 TABLET | Refills: 1 | Status: SHIPPED | OUTPATIENT
Start: 2019-08-26 | End: 2020-01-06 | Stop reason: SDUPTHER

## 2019-08-26 NOTE — PROGRESS NOTES
154 lb (69.9 kg)   05/01/17 157 lb (71.2 kg)       Physical examination:  General: awake alert, oriented x3, no abnormal position or movements. HEENT: normocephalic non-traumatic, no exophthalmos   Neck: supple, no LN enlargement, no thyromegaly, no thyroid tenderness, no JVD. Pulm: Clear equal air entry no added sounds, no wheezing or rhonchi    CVS: S1 + S2, no murmur, no heave. Dorsalis pedis pulse palpable   Abd: soft lax, no tenderness, no organomegaly, audible bowel sounds.    Skin: warm, no lesions, no rash. + callus, no Ulcers, No acanthosis nigricans   Neuro: CN intact, Monofilament sensation decreased bilateral , muscle power normal  Psych: normal mood, and affect      Review of Laboratory Data:  I have reviewed the following:  Lab Results   Component Value Date/Time    WBC 2.7 (L) 07/07/2018 10:51 AM    RBC 3.72 07/07/2018 10:51 AM    HGB 11.5 07/07/2018 10:51 AM    HCT 36.1 07/07/2018 10:51 AM    MCV 97.0 07/07/2018 10:51 AM    MCH 30.9 07/07/2018 10:51 AM    MCHC 31.9 (L) 07/07/2018 10:51 AM    RDW 12.2 07/07/2018 10:51 AM     07/07/2018 10:51 AM    MPV 10.6 07/07/2018 10:51 AM      Lab Results   Component Value Date/Time     04/27/2019 10:19 AM    K 3.9 04/27/2019 10:19 AM    CO2 28 04/27/2019 10:19 AM    BUN 10 04/27/2019 10:19 AM    CALCIUM 9.5 04/27/2019 10:19 AM      Lab Results   Component Value Date    LABA1C 8.1 08/26/2019    GLUCOSE 75 04/27/2019    MALBCR - 08/21/2019    LABMICR <12.0 08/21/2019    LABCREA 156 08/21/2019     Lab Results   Component Value Date    CHOL 175 08/21/2019    CHOL 156 04/27/2019    CHOL 181 07/07/2018    CHOL 142 01/23/2017    TRIG 72 08/21/2019    TRIG 46 04/27/2019    TRIG 70 07/07/2018    TRIG 60 01/23/2017    HDL 57 08/21/2019    HDL 63 04/27/2019    HDL 55 07/07/2018    HDL 48 01/23/2017     Lab Results   Component Value Date    VITD25 42 07/07/2018    VITD25 36 01/23/2017    VITD25 40 07/15/2016    VITD25 20 10/03/2015     Medical Records/Labs/Images review:   I personally reviewed and summarized previous records   All labs were reviewed independently     Israel Kasper, a 60 y.o.-old female seen in for the following issues     Diabetes Mellitus Type 2    · A1c now 8.1%   · Stop Basaglar change oral DM regimen to: Glimepiride 4 mg BID, Januvia 100 mg daily, Metformin 1000 mg BID  · Advised to check blood sugars 2-3 times a day before meals and fax the results to our office in a wk. At that time will likely increase Glimepiride dose and consider stopping Basaglar   · Discussed with patient A1c and blood sugar goals   · Optimal blood sugars: 100-140 pre-prandial, < 180 peak post-prandial  · The patient counseled about the complications of uncontrolled diabetes   · Patient was counselled about the importance of self-blood glucose monitoring and eating consistent carb diet to avoid blood sugar fluctuations   · Patient up to date with the routine diabetes maintenance and prevention  · Discussed lifestyle changes including diet and exercise with patient; recommended 150 minutes of moderate intensity exercise per week. · Diabetes labs before next visit     vitD deficiency   · Continue weekly vitD supplements     Return in about 4 months (around 12/26/2019) for DM type 2 . The above issues were reviewed with the patient who understood and agreed with the plan. 30 minutes were spent today in management of this patient. More than 50% of time spent on counseling of patient on above diagnosis. Thank you for allowing us to participate in the care of this patient. Please do not hesitate to contact us with any additional questions. Diagnosis Orders   1.  Type 2 diabetes mellitus with complication, unspecified whether long term insulin use (HCC)  POCT glycosylated hemoglobin (Hb A1C)    glimepiride (AMARYL) 4 MG tablet       Reddy Cox MD  Endocrinologist, [facility])   [address] [facility] [address] Phone: [phone]  Fax: [phone]  --------------------------------------------  Electronically signed

## 2019-09-10 ENCOUNTER — TELEPHONE (OUTPATIENT)
Dept: ENDOCRINOLOGY | Age: 59
End: 2019-09-10

## 2019-10-13 DIAGNOSIS — E11.8 TYPE 2 DIABETES MELLITUS WITH COMPLICATION (HCC): ICD-10-CM

## 2019-10-15 RX ORDER — SITAGLIPTIN 100 MG/1
TABLET, FILM COATED ORAL
Qty: 90 TABLET | Refills: 3 | Status: SHIPPED | OUTPATIENT
Start: 2019-10-15 | End: 2020-01-06

## 2020-01-06 ENCOUNTER — OFFICE VISIT (OUTPATIENT)
Dept: ENDOCRINOLOGY | Age: 60
End: 2020-01-06
Payer: COMMERCIAL

## 2020-01-06 VITALS
WEIGHT: 157.6 LBS | SYSTOLIC BLOOD PRESSURE: 130 MMHG | BODY MASS INDEX: 23.34 KG/M2 | OXYGEN SATURATION: 98 % | DIASTOLIC BLOOD PRESSURE: 70 MMHG | HEIGHT: 69 IN | HEART RATE: 84 BPM | RESPIRATION RATE: 16 BRPM

## 2020-01-06 PROBLEM — Z91.119 DIETARY NONCOMPLIANCE: Status: ACTIVE | Noted: 2020-01-06

## 2020-01-06 LAB — HBA1C MFR BLD: 8.5 %

## 2020-01-06 PROCEDURE — 83036 HEMOGLOBIN GLYCOSYLATED A1C: CPT | Performed by: INTERNAL MEDICINE

## 2020-01-06 PROCEDURE — 99214 OFFICE O/P EST MOD 30 MIN: CPT | Performed by: INTERNAL MEDICINE

## 2020-01-06 RX ORDER — ERGOCALCIFEROL 1.25 MG/1
50000 CAPSULE ORAL WEEKLY
Qty: 12 CAPSULE | Refills: 5 | Status: SHIPPED
Start: 2020-01-06 | End: 2021-03-05

## 2020-01-06 RX ORDER — GLIMEPIRIDE 4 MG/1
TABLET ORAL
Qty: 180 TABLET | Refills: 3 | Status: SHIPPED
Start: 2020-01-06 | End: 2020-05-08 | Stop reason: SDUPTHER

## 2020-01-06 NOTE — PROGRESS NOTES
ENDOCRINOLOGY CLINIC NOTE    Date of Service: 1/6/2020    Primary Care Physician: Adeline Ramirez MD.  Provider: Edgardo Juan MD          Reason for the visit:  DM type 2     History of Present Illness: The history is provided by the patient. No  was used. Accuracy of the patient data is excellent. Alexy Sorensen is a very pleasant 60 y.o. female seen in Endocrine clinic today for diabetes management     Alexy Sorensen was diagnosed with diabetes at age 47  and currently on Januvia 100 mg daily, Metformin 500 mg BID, Glimepiride 2 mg BID, basaglar 8 units daily    The patient has been checking blood sugar daily in the morning. I have reviewed BS log and readings ranging b/w 120-230   Lab Results   Component Value Date    LABA1C 8.5 01/06/2020    LABA1C 8.1 08/26/2019    LABA1C 7.6 04/27/2019     Patient has had no hypoglycemic episodes   Had diabetes class in the past but hasn't been mindful of what has been eating and wasn't strictly following diabetes diet as encouraged   I reviewed current medications and the patient has no issues with diabetes medications  The patient is up to date with eye exam and denied any h/o diabetic retinopathy. She is seeing podiatrist every 3 months and also performs her own foot care.  Last seen was last month   Microvascular complications:  No Retinopathy, Nephropathy or Neuropathy   Macrovascular complications: no CAD, PVD, or Stroke  Refuses Flushot      PAST MEDICAL HISTORY   Past Medical History:   Diagnosis Date    Seizures ([facility] [address]     Type II or unspecified type diabetes mellitus without mention of complication, not stated as uncontrolled     Unspecified sleep apnea     stopped after weight loss     PAST SURGICAL HISTORY   Past Surgical History:   Procedure Laterality Date    HYSTERECTOMY       SOCIAL HISTORY   Social History     Socioeconomic History    Marital status:      Spouse name: Not on file    Number of children: Not on file    Years of tablet twice a day 180 tablet 3    insulin glargine (BASAGLAR KWIKPEN) 100 UNIT/ML injection pen Inject 8 Units into the skin nightly 5 pen 5    Insulin Pen Needle (BD PEN NEEDLE JEFFERY U/F) 32G X 4 MM MISC Checks three times a day 200 each 3    vitamin D (ERGOCALCIFEROL) 1.25 MG (74314 UT) CAPS capsule Take 1 capsule by mouth once a week Takes on fridays 12 capsule 5    blood glucose monitor strips One-Touch Ultra strips. Test 2  times a day 200 strip 5    levETIRAcetam (KEPPRA) 750 MG tablet Take 1 tablet by mouth 2 times daily (Patient taking differently: Take 1,000 mg by mouth nightly ) 180 tablet 2     No current facility-administered medications for this visit. Review of Systems  Constitutional: No fever, no chills, no diaphoresis, no generalized weakness. HEENT: No blurred vision, No sore throat, no ear pain, no hair loss  Neck: denied any neck swelling, difficulty swallowing,   Cardio-pulmonary: No CP, SOB or palpitation, No orthopnea or PND. No cough or wheezing. GI: No N/V/D, no constipation, No abdominal pain, no melena or hematochezia   : Denied any dysuria, hematuria, flank pain, discharge, or incontinence. Skin: denied any rash, ulcer, Hirsute, or hyperpigmentation. MSK: denied any joint deformity, joint pain/swelling, muscle pain, or back pain.   Neuro: no numbness, no tingling, no weakness, _  OBJECTIVE    /70 (Site: Left Upper Arm, Position: Sitting, Cuff Size: Medium Adult)   Pulse 84   Resp 16   Ht 5' 9\" (1.753 m)   Wt 157 lb 9.6 oz (71.5 kg)   SpO2 98%   BMI 23.27 kg/m²   BP Readings from Last 4 Encounters:   01/06/20 130/70   08/26/19 138/84   04/29/19 118/78   12/31/18 (!) 148/80     Wt Readings from Last 6 Encounters:   01/06/20 157 lb 9.6 oz (71.5 kg)   08/26/19 163 lb 3.2 oz (74 kg)   04/29/19 164 lb 12.8 oz (74.8 kg)   12/31/18 152 lb (68.9 kg)   08/20/18 141 lb 6 oz (64.1 kg)   11/02/17 154 lb (69.9 kg)       Physical examination:  General: awake alert, oriented x3, no abnormal position or movements. HEENT: normocephalic non-traumatic, no exophthalmos   Neck: supple, no LN enlargement, no thyromegaly, no thyroid tenderness, no JVD. Pulm: Clear equal air entry no added sounds, no wheezing or rhonchi    CVS: S1 + S2, no murmur, no heave. Dorsalis pedis pulse palpable   Abd: soft lax, no tenderness, no organomegaly, audible bowel sounds.    Skin: warm, no lesions, no rash. + callus, no Ulcers, No acanthosis nigricans   Neuro: CN intact, Monofilament sensation decreased bilateral , muscle power normal  Psych: normal mood, and affect      Review of Laboratory Data:  I have reviewed the following:  Lab Results   Component Value Date/Time    WBC 2.7 (L) 07/07/2018 10:51 AM    RBC 3.72 07/07/2018 10:51 AM    HGB 11.5 07/07/2018 10:51 AM    HCT 36.1 07/07/2018 10:51 AM    MCV 97.0 07/07/2018 10:51 AM    MCH 30.9 07/07/2018 10:51 AM    MCHC 31.9 (L) 07/07/2018 10:51 AM    RDW 12.2 07/07/2018 10:51 AM     07/07/2018 10:51 AM    MPV 10.6 07/07/2018 10:51 AM      Lab Results   Component Value Date/Time     04/27/2019 10:19 AM    K 3.9 04/27/2019 10:19 AM    CO2 28 04/27/2019 10:19 AM    BUN 10 04/27/2019 10:19 AM    CALCIUM 9.5 04/27/2019 10:19 AM      Lab Results   Component Value Date    LABA1C 8.5 01/06/2020    GLUCOSE 75 04/27/2019    MALBCR - 08/21/2019    LABMICR <12.0 08/21/2019    LABCREA 156 08/21/2019     Lab Results   Component Value Date    CHOL 175 08/21/2019    CHOL 156 04/27/2019    CHOL 181 07/07/2018    CHOL 142 01/23/2017    TRIG 72 08/21/2019    TRIG 46 04/27/2019    TRIG 70 07/07/2018    TRIG 60 01/23/2017    HDL 57 08/21/2019    HDL 63 04/27/2019    HDL 55 07/07/2018    HDL 48 01/23/2017     Lab Results   Component Value Date    VITD25 42 07/07/2018    VITD25 36 01/23/2017    VITD25 40 07/15/2016    VITD25 20 10/03/2015     Medical Records/Labs/Images review:   I personally reviewed and summarized previous records   All labs were reviewed independently     ASSESSMENT & RECOMMENDATIONS   Jignesh Cabrales, a 60 y.o.-old female seen in for the following issues     Diabetes Mellitus Type 2    · DM is uncontrolled, A1c now 8.6%   · Stop Januvia   · Continue Basaglar 8 units daily at bedtime, Glimepiride 4 mg BID, Metformin 1000 mg BID  · Start Trulicity 9.59 mg q wk I discussed side effect profile of GLP-1 agonists with patient. Patient denies history of pancreatitis, medullary thyroid cancer or family history of MEN2  · Advised to check blood sugars 2-3 times a day before meals and fax the results to our office in a wk  · Discussed with patient A1c and blood sugar goals   · Optimal blood sugars: 100-140 pre-prandial, < 180 peak post-prandial  · The patient counseled about the complications of uncontrolled diabetes   · Patient was counselled about the importance of self-blood glucose monitoring and eating consistent carb diet to avoid blood sugar fluctuations   · Patient up to date with the routine diabetes maintenance and prevention  · Discussed lifestyle changes including diet and exercise with patient; recommended 150 minutes of moderate intensity exercise per week. · Diabetes labs before next visit     vitD deficiency   · Continue weekly vitD supplements (has been on this dose for long time)   · citD level before next visit      Dietary noncompliance   Discussed with patient the importance of eating consistent carbohydrate meals, avoiding high glycemic index food. Also, discussed with patient the risk and negative consequences of dietary noncompliance on blood glucose control, blood pressure and weight    Return in about 4 months (around 5/6/2020) for DM type 2 on insulin . The above issues were reviewed with the patient who understood and agreed with the plan. 30 minutes were spent today in management of this patient. More than 50% of time spent on counseling of patient on above diagnosis.      Thank you for allowing us to participate in the

## 2020-01-06 NOTE — PATIENT INSTRUCTIONS
Recommendations for today's visit  · Stop Januvia   · Take Basaglar 8 units daily at bedtime   · Continue current dose of Metformin and Glimepiride   Start Trulicity 0.95 mg subcutaneous once every 7 days. May experience, nausea, should get better over the weeks. if you vomit several times, you should stop the drug and call our office. If you develop acute abdominal pain and vomiting, stop the medication completely and contact our office   · Check Blood sugar 2 times/day before meals and at bedtime and send us sugar log in a week     These are your blood sugar, blood pressure, cholesterol and A1c goals:  · Blood sugar fastin mg/dl to 130 mg/dl  · Blood sugar before meals: <150 mg/dl  · Peak blood sugar lower than 180 mg/dl  · Bad cholesterol (LDL cholesterol): less than 100 mg/dl  · Blood pressure: less than 140/80 mmHg\  · A1c: between 6.5 - 7.5%      Steps for managing low blood sugar  1. Eat 15 grams of glucose of simple carbohydrate, as found in:   1 tablespoon sugar, Zenia or corn syrup    4 oz (1/2 cup) of juice or regular soda   Glucose Tablet or gel (follow package instruction)   2. Wait 15 min and check blood sugar again   3. Repeat until blood sugar within range  4.  Once within range, follow up with snack or meal within 1 hour      I you have any questions please call Dr. Martin De La Torre office       Juan Alberto Barnes MD  Endocrinologist, [facility])   [facility] [address] [facility] [address]   Phone: [phone]  Fax: [phone]

## 2020-01-20 ENCOUNTER — TELEPHONE (OUTPATIENT)
Dept: ENDOCRINOLOGY | Age: 60
End: 2020-01-20

## 2020-01-20 NOTE — TELEPHONE ENCOUNTER
Shanna Juarez took her 2nd dose of Trulicity on Saturday. She is having some GI symptoms (diarrhea) but called in today stating her blood sugars have been low. Sunday         FBS 74,  Ac dinner  84  Monday        Fbs 130    Please advise.  thanks

## 2020-01-20 NOTE — TELEPHONE ENCOUNTER
Stop Insulin (Basaglar) completely and send us sugar log later this week     Pleas let us know if you continue to experience side effect of trulicity for the next few days

## 2020-01-30 ENCOUNTER — TELEPHONE (OUTPATIENT)
Dept: ENDOCRINOLOGY | Age: 60
End: 2020-01-30

## 2020-01-30 NOTE — TELEPHONE ENCOUNTER
Shanna Juarez called in to advise you she cannot tolerate trulicity. She restarted basaglar and [place] and [place] as before.

## 2020-03-30 ENCOUNTER — HOSPITAL ENCOUNTER (EMERGENCY)
Age: 60
Discharge: HOME | End: 2020-03-30
Payer: OTHER MISCELLANEOUS

## 2020-03-30 ENCOUNTER — APPOINTMENT (OUTPATIENT)
Dept: GENERAL RADIOLOGY | Age: 60
End: 2020-03-30
Payer: OTHER MISCELLANEOUS

## 2020-03-30 ENCOUNTER — APPOINTMENT (OUTPATIENT)
Dept: CT IMAGING | Age: 60
End: 2020-03-30
Payer: OTHER MISCELLANEOUS

## 2020-03-30 VITALS
BODY MASS INDEX: 22.96 KG/M2 | HEART RATE: 99 BPM | OXYGEN SATURATION: 100 % | DIASTOLIC BLOOD PRESSURE: 72 MMHG | WEIGHT: 155 LBS | RESPIRATION RATE: 16 BRPM | TEMPERATURE: 98.8 F | SYSTOLIC BLOOD PRESSURE: 152 MMHG | HEIGHT: 69 IN

## 2020-03-30 PROCEDURE — 6370000000 HC RX 637 (ALT 250 FOR IP): Performed by: PHYSICIAN ASSISTANT

## 2020-03-30 PROCEDURE — 70450 CT HEAD/BRAIN W/O DYE: CPT

## 2020-03-30 PROCEDURE — 73030 X-RAY EXAM OF SHOULDER: CPT

## 2020-03-30 PROCEDURE — 72125 CT NECK SPINE W/O DYE: CPT

## 2020-03-30 PROCEDURE — 99284 EMERGENCY DEPT VISIT MOD MDM: CPT

## 2020-03-30 RX ORDER — IBUPROFEN 600 MG/1
600 TABLET ORAL ONCE
Status: COMPLETED | OUTPATIENT
Start: 2020-03-30 | End: 2020-03-30

## 2020-03-30 RX ADMIN — IBUPROFEN 600 MG: 600 TABLET, FILM COATED ORAL at 21:15

## 2020-03-30 ASSESSMENT — PAIN SCALES - GENERAL
PAINLEVEL_OUTOF10: 8
PAINLEVEL_OUTOF10: 6

## 2020-05-08 ENCOUNTER — VIRTUAL VISIT (OUTPATIENT)
Dept: ENDOCRINOLOGY | Age: 60
End: 2020-05-08
Payer: COMMERCIAL

## 2020-05-08 PROCEDURE — 99214 OFFICE O/P EST MOD 30 MIN: CPT | Performed by: INTERNAL MEDICINE

## 2020-05-08 PROCEDURE — 3052F HG A1C>EQUAL 8.0%<EQUAL 9.0%: CPT | Performed by: INTERNAL MEDICINE

## 2020-05-08 RX ORDER — INSULIN GLARGINE 100 [IU]/ML
10 INJECTION, SOLUTION SUBCUTANEOUS NIGHTLY
Qty: 5 PEN | Refills: 3 | Status: SHIPPED
Start: 2020-05-08 | End: 2020-10-15 | Stop reason: SDUPTHER

## 2020-05-08 RX ORDER — GLIMEPIRIDE 4 MG/1
TABLET ORAL
Qty: 180 TABLET | Refills: 3 | Status: SHIPPED
Start: 2020-05-08 | End: 2020-10-15 | Stop reason: SDUPTHER

## 2020-05-08 NOTE — PROGRESS NOTES
smoked. She has never used smokeless tobacco.  Alcol:   reports current alcohol use. Illicit Drugs:   reports no history of drug use. FAMILY HISTORY   Family History   Problem Relation Age of Onset    Thyroid Disease Mother     High Blood Pressure Mother     Arthritis Mother     High Blood Pressure Father     High Blood Pressure Sister     Diabetes Maternal Uncle     Diabetes Paternal Aunt     Diabetes Maternal Grandmother     Cancer Maternal Grandfather     Thyroid Disease Sister      ALLERGIES AND DRUG REACTIONS   Allergies   Allergen Reactions    Sulfa Antibiotics Itching    Trulicity [Dulaglutide] Diarrhea and Nausea And Vomiting       CURRENT MEDICATIONS     Current Outpatient Medications   Medication Sig Dispense Refill    glimepiride (AMARYL) 4 MG tablet Take 1 tablet twice a day 180 tablet 3    metFORMIN (GLUCOPHAGE) 1000 MG tablet Take 1 tablet by mouth 2 times daily (with meals) 180 tablet 3    insulin glargine (BASAGLAR KWIKPEN) 100 UNIT/ML injection pen Inject 10 Units into the skin nightly 5 pen 3    SITagliptin (JANUVIA) 100 MG tablet Take 1 tablet by mouth daily 90 tablet 3    Insulin Pen Needle (BD PEN NEEDLE JEFFERY U/F) 32G X 4 MM MISC Checks three times a day 200 each 3    vitamin D (ERGOCALCIFEROL) 1.25 MG (12176 UT) CAPS capsule Take 1 capsule by mouth once a week Takes on fridays 12 capsule 5    blood glucose monitor strips One-Touch Ultra strips. Test 2  times a day 200 strip 5    levETIRAcetam (KEPPRA) 750 MG tablet Take 1 tablet by mouth 2 times daily (Patient taking differently: Take 1,000 mg by mouth nightly ) 180 tablet 2     No current facility-administered medications for this visit. Review of Systems  Constitutional: No fever, no chills, no diaphoresis, no generalized weakness.   HEENT: No blurred vision, No sore throat, no ear pain, no hair loss  Neck: denied any neck swelling, difficulty swallowing,   Cardio-pulmonary: No CP, SOB or palpitation, No orthopnea or PND. No cough or wheezing. GI: No N/V/D, no constipation, No abdominal pain, no melena or hematochezia   : Denied any dysuria, hematuria, flank pain, discharge, or incontinence. Skin: denied any rash, ulcer, Hirsute, or hyperpigmentation. MSK: denied any joint deformity, joint pain/swelling, muscle pain, or back pain. Neuro: no numbness, no tingling, no weakness, _  OBJECTIVE    There were no vitals taken for this visit. BP Readings from Last 4 Encounters:   03/30/20 (!) 152/72   01/06/20 130/70   08/26/19 138/84   04/29/19 118/78     Wt Readings from Last 6 Encounters:   03/30/20 155 lb (70.3 kg)   01/06/20 157 lb 9.6 oz (71.5 kg)   08/26/19 163 lb 3.2 oz (74 kg)   04/29/19 164 lb 12.8 oz (74.8 kg)   12/31/18 152 lb (68.9 kg)   08/20/18 141 lb 6 oz (64.1 kg)     Physical examination:  Due to this being a TeleHealth encounter, evaluation of the following organ systems is limited: Vitals/Constitutional/EENT/Resp/CV/GI//MS/Neuro/Skin/Heme-Lymph-Imm. Modified physical exam through Telemedicine camera    General: Communicating well via camera   Neck: no obvious neck mass. No obvious neck deformity     CVS: no distress   Chest: no distress. Chest is moving with respiration    Extremities:  no visible tremor  Skin: No visible rashes as seen from camera   Musculoskeletal: no visible deformity  Neuro: Alert and oriented to person, place, and time. Psychiatric: Normal mood and affect.  Behavior is normal      Review of Laboratory Data:  I have reviewed the following:  Lab Results   Component Value Date/Time    WBC 2.7 (L) 07/07/2018 10:51 AM    RBC 3.72 07/07/2018 10:51 AM    HGB 11.5 07/07/2018 10:51 AM    HCT 36.1 07/07/2018 10:51 AM    MCV 97.0 07/07/2018 10:51 AM    MCH 30.9 07/07/2018 10:51 AM    MCHC 31.9 (L) 07/07/2018 10:51 AM    RDW 12.2 07/07/2018 10:51 AM     07/07/2018 10:51 AM    MPV 10.6 07/07/2018 10:51 AM      Lab Results   Component Value Date/Time     04/27/2019 10:19 AM    K 3.9 04/27/2019 10:19 AM    CO2 28 04/27/2019 10:19 AM    BUN 10 04/27/2019 10:19 AM    CALCIUM 9.5 04/27/2019 10:19 AM      Lab Results   Component Value Date    LABA1C 8.5 01/06/2020    GLUCOSE 75 04/27/2019    MALBCR - 08/21/2019    LABMICR <12.0 08/21/2019    LABCREA 156 08/21/2019     Lab Results   Component Value Date    CHOL 175 08/21/2019    CHOL 156 04/27/2019    CHOL 181 07/07/2018    CHOL 142 01/23/2017    TRIG 72 08/21/2019    TRIG 46 04/27/2019    TRIG 70 07/07/2018    TRIG 60 01/23/2017    HDL 57 08/21/2019    HDL 63 04/27/2019    HDL 55 07/07/2018    HDL 48 01/23/2017     Lab Results   Component Value Date    VITD25 42 07/07/2018    VITD25 36 01/23/2017    VITD25 40 07/15/2016    VITD25 20 10/03/2015     Medical Records/Labs/Images review:   I personally reviewed and summarized previous records   All labs were reviewed independently     Israel Kasper, a 60 y.o.-old female seen in for the following issues     Diabetes Mellitus Type 2    · Change DM regimen to: Basaglar 10 units daily at bedtime, Glimepiride 4 mg BID, Metformin 1000 mg BID, Januvia 100 mg daily   · Wasn't able to tolerate GLP-1 agonist   · Advised to check blood sugars 2-3 times a day before meals and fax the results to our office in a wk  · Discussed with patient A1c and blood sugar goals   · Optimal blood sugars: 100-140 pre-prandial, < 180 peak post-prandial  · The patient counseled about the complications of uncontrolled diabetes   · Patient was counselled about the importance of self-blood glucose monitoring and eating consistent carb diet to avoid blood sugar fluctuations   · Patient up to date with the routine diabetes maintenance and prevention  · Discussed lifestyle changes including diet and exercise with patient; recommended 150 minutes of moderate intensity exercise per week.    · Diabetes labs before next visit     vitD deficiency   · Continue weekly vitD supplements (has been on consult

## 2020-09-16 ENCOUNTER — HOSPITAL ENCOUNTER (OUTPATIENT)
Age: 60
Discharge: HOME | End: 2020-09-16
Payer: COMMERCIAL

## 2020-09-16 LAB
ALBUMIN SERPL-MCNC: 4.4 G/DL (ref 3.5–5.2)
ALP BLD-CCNC: 52 U/L (ref 35–104)
ALT SERPL-CCNC: 9 U/L (ref 0–32)
ANION GAP SERPL CALCULATED.3IONS-SCNC: 12 MMOL/L (ref 7–16)
AST SERPL-CCNC: 13 U/L (ref 0–31)
BASOPHILS ABSOLUTE: 0.02 E9/L (ref 0–0.2)
BASOPHILS RELATIVE PERCENT: 0.6 % (ref 0–2)
BILIRUB SERPL-MCNC: 0.7 MG/DL (ref 0–1.2)
BUN BLDV-MCNC: 13 MG/DL (ref 8–23)
CALCIUM SERPL-MCNC: 9.3 MG/DL (ref 8.6–10.2)
CHLORIDE BLD-SCNC: 99 MMOL/L (ref 98–107)
CO2: 27 MMOL/L (ref 22–29)
CREAT SERPL-MCNC: 0.9 MG/DL (ref 0.5–1)
CREATININE URINE: 195 MG/DL (ref 29–226)
EOSINOPHILS ABSOLUTE: 0.03 E9/L (ref 0.05–0.5)
EOSINOPHILS RELATIVE PERCENT: 0.8 % (ref 0–6)
GFR AFRICAN AMERICAN: >60
GFR NON-AFRICAN AMERICAN: >60 ML/MIN/1.73
GLUCOSE BLD-MCNC: 154 MG/DL (ref 74–99)
HBA1C MFR BLD: 9.5 % (ref 4–5.6)
HCT VFR BLD CALC: 33.5 % (ref 34–48)
HEMOGLOBIN: 10.1 G/DL (ref 11.5–15.5)
IMMATURE GRANULOCYTES #: 0.01 E9/L
IMMATURE GRANULOCYTES %: 0.3 % (ref 0–5)
LYMPHOCYTES ABSOLUTE: 0.81 E9/L (ref 1.5–4)
LYMPHOCYTES RELATIVE PERCENT: 22.9 % (ref 20–42)
MCH RBC QN AUTO: 28.7 PG (ref 26–35)
MCHC RBC AUTO-ENTMCNC: 30.1 % (ref 32–34.5)
MCV RBC AUTO: 95.2 FL (ref 80–99.9)
MICROALBUMIN UR-MCNC: 17.8 MG/L
MICROALBUMIN/CREAT UR-RTO: 9.1 (ref 0–30)
MONOCYTES ABSOLUTE: 0.25 E9/L (ref 0.1–0.95)
MONOCYTES RELATIVE PERCENT: 7.1 % (ref 2–12)
NEUTROPHILS ABSOLUTE: 2.42 E9/L (ref 1.8–7.3)
NEUTROPHILS RELATIVE PERCENT: 68.3 % (ref 43–80)
PDW BLD-RTO: 13.9 FL (ref 11.5–15)
PLATELET # BLD: 305 E9/L (ref 130–450)
PMV BLD AUTO: 10.2 FL (ref 7–12)
POTASSIUM SERPL-SCNC: 4.5 MMOL/L (ref 3.5–5)
RBC # BLD: 3.52 E12/L (ref 3.5–5.5)
SODIUM BLD-SCNC: 138 MMOL/L (ref 132–146)
TOTAL PROTEIN: 7.3 G/DL (ref 6.4–8.3)
VITAMIN D 25-HYDROXY: 45 NG/ML (ref 30–100)
WBC # BLD: 3.5 E9/L (ref 4.5–11.5)

## 2020-09-16 PROCEDURE — 80053 COMPREHEN METABOLIC PANEL: CPT

## 2020-09-16 PROCEDURE — 83036 HEMOGLOBIN GLYCOSYLATED A1C: CPT

## 2020-09-16 PROCEDURE — 36415 COLL VENOUS BLD VENIPUNCTURE: CPT

## 2020-09-16 PROCEDURE — 82570 ASSAY OF URINE CREATININE: CPT

## 2020-09-16 PROCEDURE — 80177 DRUG SCRN QUAN LEVETIRACETAM: CPT

## 2020-09-16 PROCEDURE — 82306 VITAMIN D 25 HYDROXY: CPT

## 2020-09-16 PROCEDURE — 85025 COMPLETE CBC W/AUTO DIFF WBC: CPT

## 2020-09-16 PROCEDURE — 82044 UR ALBUMIN SEMIQUANTITATIVE: CPT

## 2020-09-19 LAB — KEPPRA: 60 UG/ML (ref 12–46)

## 2020-10-15 ENCOUNTER — VIRTUAL VISIT (OUTPATIENT)
Dept: ENDOCRINOLOGY | Age: 60
End: 2020-10-15
Payer: COMMERCIAL

## 2020-10-15 PROCEDURE — 99214 OFFICE O/P EST MOD 30 MIN: CPT | Performed by: INTERNAL MEDICINE

## 2020-10-15 RX ORDER — INSULIN GLARGINE 100 [IU]/ML
14 INJECTION, SOLUTION SUBCUTANEOUS NIGHTLY
Qty: 10 PEN | Refills: 3 | Status: SHIPPED
Start: 2020-10-15 | End: 2021-05-03 | Stop reason: SDUPTHER

## 2020-10-15 RX ORDER — GLIMEPIRIDE 4 MG/1
TABLET ORAL
Qty: 180 TABLET | Refills: 3 | Status: SHIPPED
Start: 2020-10-15 | End: 2021-01-27 | Stop reason: SDUPTHER

## 2020-10-15 NOTE — PROGRESS NOTES
[address] [place] [address] Department of Endocrinology Diabetes and Metabolism   [address] [facility] [address]   Phone: [phone]  Fax: [phone]      Date of Service: 10/15/2020    Primary Care Physician: Dorothea Long MD.  Provider: Melina Hodgkins MD          Reason for the visit:  DM type 2     History of Present Illness: The history is provided by the patient. No  was used. Accuracy of the patient data is excellent. Elly Sauceda is a very pleasant 60 y.o. female seen today for follow up visit     Elly Sauceda was diagnosed with diabetes at age 47  and currently on Januvia 100 mg daily, Metformin 1000 mg BID, Glimepiride 4 mg BID, basaglar 10 units daily    The patient has been checking blood sugar daily in the morning. Am readings usually around 140   Lab Results   Component Value Date    LABA1C 9.5 09/16/2020    LABA1C 8.5 01/06/2020    LABA1C 8.1 08/26/2019     Patient has had no hypoglycemic episodes   Had diabetes class in the past but hasn't been mindful of what has been eating and wasn't strictly following diabetes diet as encouraged   I reviewed current medications and the patient has no issues with diabetes medications  The patient is up to date with eye exam and denied any h/o diabetic retinopathy. She is seeing podiatrist every 3 months and also performs her own foot care.  Last seen was last month   Microvascular complications:  No Retinopathy, Nephropathy or Neuropathy   Macrovascular complications: no CAD, PVD, or Stroke  Refuses Flushot      PAST MEDICAL HISTORY   Past Medical History:   Diagnosis Date    Seizures ([address]     Type II or unspecified type diabetes mellitus without mention of complication, not stated as uncontrolled     Unspecified sleep apnea     stopped after weight loss     PAST SURGICAL HISTORY   Past Surgical History:   Procedure Laterality Date    HYSTERECTOMY       SOCIAL HISTORY   Tobacco:   reports that she has never smoked. She has never used smokeless tobacco.  Alcol:   reports current alcohol use. Illicit Drugs:   reports no history of drug use. FAMILY HISTORY   Family History   Problem Relation Age of Onset    Thyroid Disease Mother     High Blood Pressure Mother     Arthritis Mother     High Blood Pressure Father     High Blood Pressure Sister     Diabetes Maternal Uncle     Diabetes Paternal Aunt     Diabetes Maternal Grandmother     Cancer Maternal Grandfather     Thyroid Disease Sister      ALLERGIES AND DRUG REACTIONS   Allergies   Allergen Reactions    Sulfa Antibiotics Itching    Trulicity [Dulaglutide] Diarrhea and Nausea And Vomiting       CURRENT MEDICATIONS     Current Outpatient Medications   Medication Sig Dispense Refill    glimepiride (AMARYL) 4 MG tablet Take 1 tablet twice a day 180 tablet 3    metFORMIN (GLUCOPHAGE) 1000 MG tablet Take 1 tablet by mouth 2 times daily (with meals) 180 tablet 3    insulin glargine (BASAGLAR KWIKPEN) 100 UNIT/ML injection pen Inject 10 Units into the skin nightly 5 pen 3    SITagliptin (JANUVIA) 100 MG tablet Take 1 tablet by mouth daily 90 tablet 3    Insulin Pen Needle (BD PEN NEEDLE JEFFERY U/F) 32G X 4 MM MISC Checks three times a day 200 each 3    vitamin D (ERGOCALCIFEROL) 1.25 MG (05054 UT) CAPS capsule Take 1 capsule by mouth once a week Takes on fridays 12 capsule 5    blood glucose monitor strips One-Touch Ultra strips. Test 2  times a day 200 strip 5    levETIRAcetam (KEPPRA) 750 MG tablet Take 1 tablet by mouth 2 times daily (Patient taking differently: Take 1,000 mg by mouth nightly ) 180 tablet 2     No current facility-administered medications for this visit. Review of Systems  Constitutional: No fever, no chills, no diaphoresis, no generalized weakness.   HEENT: No blurred vision, No sore throat, no ear pain, no hair loss  Neck: denied any neck swelling, difficulty swallowing,   Cardio-pulmonary: No CP, SOB or palpitation, No orthopnea or PND. No cough or wheezing. GI: No N/V/D, no constipation, No abdominal pain, no melena or hematochezia   : Denied any dysuria, hematuria, flank pain, discharge, or incontinence. Skin: denied any rash, ulcer, Hirsute, or hyperpigmentation. MSK: denied any joint deformity, joint pain/swelling, muscle pain, or back pain. Neuro: no numbness, no tingling, no weakness, _  OBJECTIVE    There were no vitals taken for this visit. BP Readings from Last 4 Encounters:   03/30/20 (!) 152/72   01/06/20 130/70   08/26/19 138/84   04/29/19 118/78     Wt Readings from Last 6 Encounters:   03/30/20 155 lb (70.3 kg)   01/06/20 157 lb 9.6 oz (71.5 kg)   08/26/19 163 lb 3.2 oz (74 kg)   04/29/19 164 lb 12.8 oz (74.8 kg)   12/31/18 152 lb (68.9 kg)   08/20/18 141 lb 6 oz (64.1 kg)     Physical examination:  Due to this being a TeleHealth encounter, evaluation of the following organ systems is limited: Vitals/Constitutional/EENT/Resp/CV/GI//MS/Neuro/Skin/Heme-Lymph-Imm. Modified physical exam through Telemedicine camera    General: Communicating well via camera   Neck: no obvious neck mass. No obvious neck deformity     CVS: no distress   Chest: no distress. Chest is moving with respiration    Extremities:  no visible tremor  Skin: No visible rashes as seen from camera   Musculoskeletal: no visible deformity  Neuro: Alert and oriented to person, place, and time. Psychiatric: Normal mood and affect.  Behavior is normal      Review of Laboratory Data:  I have reviewed the following:  Lab Results   Component Value Date/Time    WBC 3.5 (L) 09/16/2020 07:43 AM    RBC 3.52 09/16/2020 07:43 AM    HGB 10.1 (L) 09/16/2020 07:43 AM    HCT 33.5 (L) 09/16/2020 07:43 AM    MCV 95.2 09/16/2020 07:43 AM    MCH 28.7 09/16/2020 07:43 AM    MCHC 30.1 (L) 09/16/2020 07:43 AM    RDW 13.9 09/16/2020 07:43 AM     09/16/2020 07:43 AM    MPV 10.2 09/16/2020 07:43 AM      Lab Results   Component Value Date/Time     09/16/2020 07:43 AM    K 4.5 09/16/2020 07:43 AM    CO2 27 09/16/2020 07:43 AM    BUN 13 09/16/2020 07:43 AM    CALCIUM 9.3 09/16/2020 07:43 AM      Lab Results   Component Value Date    LABA1C 9.5 09/16/2020    GLUCOSE 154 09/16/2020    MALBCR 9.1 09/16/2020    LABMICR 17.8 09/16/2020    LABCREA 195 09/16/2020     Lab Results   Component Value Date    CHOL 175 08/21/2019    CHOL 156 04/27/2019    CHOL 181 07/07/2018    CHOL 142 01/23/2017    TRIG 72 08/21/2019    TRIG 46 04/27/2019    TRIG 70 07/07/2018    TRIG 60 01/23/2017    HDL 57 08/21/2019    HDL 63 04/27/2019    HDL 55 07/07/2018    HDL 48 01/23/2017     Lab Results   Component Value Date    VITD25 45 09/16/2020    VITD25 42 07/07/2018    VITD25 36 01/23/2017    VITD25 40 07/15/2016     Medical Records/Labs/Images review:   I personally reviewed and summarized previous records   All labs were reviewed independently     Israel Kasper, a 60 y.o.-old female seen in for the following issues     Diabetes Mellitus Type 2    · Admit poor compliance with diet  · Change DM regimen to: Basaglar 10 units daily at bedtime, Glimepiride 4 mg BID, Metformin 1000 mg BID, Januvia 100 mg daily   · Wasn't able to tolerate GLP-1 agonist in the past   · Advised to check blood sugars 2-3 times a day before meals and fax the results to our office in a wk  · Discussed with patient A1c and blood sugar goals   · Patient up to date with the routine diabetes maintenance and prevention  · Diabetes labs before next visit     vitD deficiency   · Continue weekly vitD supplements (has been on this dose for long time)   · Check vitD level before next visit      Dietary noncompliance   Discussed with patient the importance of eating consistent carbohydrate meals, avoiding high glycemic index food.  Also, discussed with patient the risk and negative consequences of dietary noncompliance on blood glucose control, blood pressure and weight   Ordered diabetes

## 2021-01-27 ENCOUNTER — TELEPHONE (OUTPATIENT)
Dept: ENDOCRINOLOGY | Age: 61
End: 2021-01-27

## 2021-01-27 DIAGNOSIS — E11.8 TYPE 2 DIABETES MELLITUS WITH COMPLICATION (HCC): ICD-10-CM

## 2021-01-27 RX ORDER — GLIMEPIRIDE 4 MG/1
TABLET ORAL
Qty: 180 TABLET | Refills: 3 | Status: SHIPPED
Start: 2021-01-27 | End: 2021-02-12

## 2021-01-27 RX ORDER — GLIMEPIRIDE 4 MG/1
TABLET ORAL
Qty: 28 TABLET | Refills: 0 | Status: SHIPPED
Start: 2021-01-27 | End: 2021-01-27 | Stop reason: SDUPTHER

## 2021-01-27 NOTE — TELEPHONE ENCOUNTER
Please call pt regarding refill of her Glimepiride 4 mg bid. She has two days of medication left. She would like to have refilled at [facility] on [address]. She needs approx two supply. Please call her to confirm medication has been ordered.

## 2021-02-12 DIAGNOSIS — E11.8 TYPE 2 DIABETES MELLITUS WITH COMPLICATION (HCC): ICD-10-CM

## 2021-02-12 RX ORDER — GLIMEPIRIDE 4 MG/1
TABLET ORAL
Qty: 28 TABLET | Refills: 5 | Status: SHIPPED
Start: 2021-02-12 | End: 2022-04-11

## 2021-02-22 ENCOUNTER — OFFICE VISIT (OUTPATIENT)
Dept: ENDOCRINOLOGY | Age: 61
End: 2021-02-22
Payer: COMMERCIAL

## 2021-02-22 VITALS
TEMPERATURE: 97.1 F | OXYGEN SATURATION: 98 % | HEART RATE: 78 BPM | BODY MASS INDEX: 9.6 KG/M2 | WEIGHT: 65 LBS | SYSTOLIC BLOOD PRESSURE: 122 MMHG | DIASTOLIC BLOOD PRESSURE: 70 MMHG

## 2021-02-22 DIAGNOSIS — E55.9 VITAMIN D DEFICIENCY: ICD-10-CM

## 2021-02-22 DIAGNOSIS — E11.8 TYPE 2 DIABETES MELLITUS WITH COMPLICATION (HCC): Primary | ICD-10-CM

## 2021-02-22 LAB — HBA1C MFR BLD: 9.6 %

## 2021-02-22 PROCEDURE — 99214 OFFICE O/P EST MOD 30 MIN: CPT | Performed by: INTERNAL MEDICINE

## 2021-02-22 PROCEDURE — 83036 HEMOGLOBIN GLYCOSYLATED A1C: CPT | Performed by: INTERNAL MEDICINE

## 2021-02-22 NOTE — PROGRESS NOTES
[address] [place] [address] Department of Endocrinology Diabetes and Metabolism   [address] [facility] [address]   Phone: [phone]  Fax: [phone]      Date of Service: 2/22/2021  Primary Care Physician: Ramona Agosto MD.  Provider: Az Hernandez MD          Reason for the visit:  DM type 2     History of Present Illness: The history is provided by the patient. No  was used. Accuracy of the patient data is excellent. John Heath is a very pleasant 63 y.o. female seen today for follow up visit     John Heath was diagnosed with diabetes at age 47  and currently on Januvia 100 mg daily, Metformin 1000 mg BID, Glimepiride 4 mg BID, Basaglar 12 units daily     The patient has been checking blood sugar daily in the morning. Am readings usually around 140   Lab Results   Component Value Date    LABA1C 9.5 09/16/2020    LABA1C 8.5 01/06/2020    LABA1C 8.1 08/26/2019     Patient has had no hypoglycemic episodes   Had diabetes class in the past but hasn't been mindful of what has been eating and wasn't strictly following diabetes diet as encouraged   I reviewed current medications and the patient has no issues with diabetes medications  The patient is up to date with eye exam and denied any h/o diabetic retinopathy. She is seeing podiatrist every 3 months and also performs her own foot care. Last seen was last month   Microvascular complications:  No Retinopathy, Nephropathy or Neuropathy   Macrovascular complications: no CAD, PVD, or Stroke    PAST MEDICAL HISTORY   Past Medical History:   Diagnosis Date    Seizures ([address]     Type II or unspecified type diabetes mellitus without mention of complication, not stated as uncontrolled     Unspecified sleep apnea     stopped after weight loss     PAST SURGICAL HISTORY   Past Surgical History:   Procedure Laterality Date    HYSTERECTOMY       SOCIAL HISTORY   Tobacco:   reports that she has never smoked.  She has never used smokeless tobacco.  Alcol:   reports current alcohol use. Illicit Drugs:   reports no history of drug use. FAMILY HISTORY   Family History   Problem Relation Age of Onset    Thyroid Disease Mother     High Blood Pressure Mother     Arthritis Mother     High Blood Pressure Father     High Blood Pressure Sister     Diabetes Maternal Uncle     Diabetes Paternal Aunt     Diabetes Maternal Grandmother     Cancer Maternal Grandfather     Thyroid Disease Sister      ALLERGIES AND DRUG REACTIONS   Allergies   Allergen Reactions    Sulfa Antibiotics Itching    Trulicity [Dulaglutide] Diarrhea and Nausea And Vomiting       CURRENT MEDICATIONS     Current Outpatient Medications   Medication Sig Dispense Refill    glimepiride (AMARYL) 4 MG tablet take 1 tablet by mouth twice a day 28 tablet 5    Insulin Pen Needle (BD PEN NEEDLE JEFFERY 2ND GEN) 32G X 4 MM MISC CHECK three times a day 200 each 3    SITagliptin (JANUVIA) 100 MG tablet Take 1 tablet by mouth daily 90 tablet 3    metFORMIN (GLUCOPHAGE) 1000 MG tablet Take 1 tablet by mouth 2 times daily (with meals) 180 tablet 3    insulin glargine (BASAGLAR KWIKPEN) 100 UNIT/ML injection pen Inject 14 Units into the skin nightly (Patient taking differently: Inject 14 Units into the skin nightly 12 units pm) 10 pen 3    vitamin D (ERGOCALCIFEROL) 1.25 MG (36546 UT) CAPS capsule Take 1 capsule by mouth once a week Takes on fridays 12 capsule 5    blood glucose monitor strips One-Touch Ultra strips. Test 2  times a day 200 strip 5    levETIRAcetam (KEPPRA) 750 MG tablet Take 1 tablet by mouth 2 times daily (Patient taking differently: Take 1,000 mg by mouth nightly ) 180 tablet 2     No current facility-administered medications for this visit. Review of Systems  Constitutional: No fever, no chills, no diaphoresis, no generalized weakness.   HEENT: No blurred vision, No sore throat, no ear pain, no hair loss  Neck: denied any neck swelling, difficulty swallowing,   Cardio-pulmonary: No CP, SOB or palpitation, No orthopnea or PND. No cough or wheezing. GI: No N/V/D, no constipation, No abdominal pain, no melena or hematochezia   : Denied any dysuria, hematuria, flank pain, discharge, or incontinence. Skin: denied any rash, ulcer, Hirsute, or hyperpigmentation. MSK: denied any joint deformity, joint pain/swelling, muscle pain, or back pain. Neuro: no numbness, no tingling, no weakness, _  OBJECTIVE    /70   Pulse 78   Temp 97.1 °F (36.2 °C)   Wt 65 lb (29.5 kg)   SpO2 98%   BMI 9.60 kg/m²   BP Readings from Last 4 Encounters:   02/22/21 122/70   03/30/20 (!) 152/72   01/06/20 130/70   08/26/19 138/84     Wt Readings from Last 6 Encounters:   02/22/21 65 lb (29.5 kg)   03/30/20 155 lb (70.3 kg)   01/06/20 157 lb 9.6 oz (71.5 kg)   08/26/19 163 lb 3.2 oz (74 kg)   04/29/19 164 lb 12.8 oz (74.8 kg)   12/31/18 152 lb (68.9 kg)     Physical examination:  General: awake alert, oriented x3, no abnormal position or movements. HEENT: normocephalic non-traumatic, no exophthalmos   Neck: supple, no LN enlargement, no thyromegaly, no thyroid tenderness, no JVD. Pulm: Clear equal air entry no added sounds, no wheezing or rhonchi    CVS: S1 + S2, no murmur, no heave. Dorsalis pedis pulse palpable   Abd: soft lax, no tenderness, no organomegaly, audible bowel sounds.    Skin: warm, no lesions, no rash. + callus, no Ulcers, No acanthosis nigricans   Neuro: CN intact, Monofilament sensation decreased bilateral , muscle power normal  Psych: normal mood, and affect    Review of Laboratory Data:  I have reviewed the following:  Lab Results   Component Value Date/Time    WBC 3.5 (L) 09/16/2020 07:43 AM    RBC 3.52 09/16/2020 07:43 AM    HGB 10.1 (L) 09/16/2020 07:43 AM    HCT 33.5 (L) 09/16/2020 07:43 AM    MCV 95.2 09/16/2020 07:43 AM    MCH 28.7 09/16/2020 07:43 AM    MCHC 30.1 (L) 09/16/2020 07:43 AM    RDW 13.9 09/16/2020 07:43 AM     09/16/2020 07:43 AM MPV 10.2 09/16/2020 07:43 AM      Lab Results   Component Value Date/Time     09/16/2020 07:43 AM    K 4.5 09/16/2020 07:43 AM    CO2 27 09/16/2020 07:43 AM    BUN 13 09/16/2020 07:43 AM    CALCIUM 9.3 09/16/2020 07:43 AM      Lab Results   Component Value Date    LABA1C 9.5 09/16/2020    GLUCOSE 154 09/16/2020    MALBCR 9.1 09/16/2020    LABMICR 17.8 09/16/2020    LABCREA 195 09/16/2020     Lab Results   Component Value Date    CHOL 175 08/21/2019    CHOL 156 04/27/2019    CHOL 181 07/07/2018    CHOL 142 01/23/2017    TRIG 72 08/21/2019    TRIG 46 04/27/2019    TRIG 70 07/07/2018    TRIG 60 01/23/2017    HDL 57 08/21/2019    HDL 63 04/27/2019    HDL 55 07/07/2018    HDL 48 01/23/2017     Lab Results   Component Value Date    VITD25 45 09/16/2020    VITD25 42 07/07/2018    VITD25 36 01/23/2017    VITD25 40 07/15/2016     ASSESSMENT & RECOMMENDATIONS   [facility] Staff, a 63 y.o.-old female seen in for the following issues     Diabetes Mellitus Type 2    · Admit poor compliance with diet  · Change DM regimen to: Basaglar 12 units daily at bedtime, Glimepiride 4 mg BID, Metformin 1000 mg BID, Januvia 100 mg daily   · Start Jardiance 25 mg daily   · Wasn't able to tolerate GLP-1 agonist in the past   · Will place [facility] for a wk or two to help with better BS monitoring   · Advised to check blood sugars 2-3 times a day before meals and fax the results to our office in a wk  · Discussed with patient A1c and blood sugar goals   · Patient up to date with the routine diabetes maintenance and prevention  · Diabetes labs before next visit     vitD deficiency   · Continue vitD supplements   · Check vitD level before next visit      Dietary noncompliance   Discussed with patient the importance of eating consistent carbohydrate meals, avoiding high glycemic index food.  Also, discussed with patient the risk and negative consequences of dietary noncompliance on blood glucose control, blood pressure and weight I personally reviewed external notes from PCP and other patient's care team providers, and personally interpreted labs associated with the above diagnosis. I also ordered labs to further assess and manage the above addressed medical conditions    Return in about 3 months (around 5/22/2021) for DM type 2 . The above issues were reviewed with the patient who understood and agreed with the plan. Thank you for allowing us to participate in the care of this patient. Please do not hesitate to contact us with any additional questions. Diagnosis Orders   1. Type 2 diabetes mellitus with complication (HCC)  POCT glycosylated hemoglobin (Hb A1C)    Comprehensive Metabolic Panel    Lipid Panel    Microalbumin / Creatinine Urine Ratio    Hemoglobin A1C   2. Vitamin D deficiency  Vitamin D 25 Hydroxy       Faheem Valera MD  Endocrinologist, [facility])   [address] [place] [address], [address] [place] [id number]   Phone: [phone]  Fax: [phone]  --------------------------------------------  An electronic signature was used to authenticate this note.  Ale Coe MD on 2/22/2021 at 8:16 AM

## 2021-02-22 NOTE — PATIENT INSTRUCTIONS
Recommendations for today's visit  · Continue current dose of Basaglar, Januvia, Metformin and Glimepiride    · Start Jardiance 25 mg daily   · Check Blood sugar 2 times/day before meals and at bedtime and send us sugar log in a week     These are your blood sugar, blood pressure, cholesterol and A1c goals:  · Blood sugar fastin mg/dl to 130 mg/dl  · Blood sugar before meals: <150 mg/dl  · Peak blood sugar lower than 180 mg/dl  · A1c: between 6.5 - 7.5%      I you have any questions please call Dr. Imtiaz Justice office       Ya Cleary MD  Endocrinologist, [facility])   [facility] [address] [facility] [address]   Phone: [phone]  Fax: [phone]

## 2021-03-05 DIAGNOSIS — E55.9 VITAMIN D DEFICIENCY: ICD-10-CM

## 2021-03-05 RX ORDER — ERGOCALCIFEROL 1.25 MG/1
CAPSULE ORAL
Qty: 12 CAPSULE | Refills: 3 | Status: SHIPPED
Start: 2021-03-05 | End: 2022-01-14

## 2021-03-12 ENCOUNTER — TELEPHONE (OUTPATIENT)
Dept: ENDOCRINOLOGY | Age: 61
End: 2021-03-12

## 2021-05-03 ENCOUNTER — OFFICE VISIT (OUTPATIENT)
Dept: ENDOCRINOLOGY | Age: 61
End: 2021-05-03
Payer: COMMERCIAL

## 2021-05-03 VITALS
DIASTOLIC BLOOD PRESSURE: 68 MMHG | RESPIRATION RATE: 18 BRPM | OXYGEN SATURATION: 98 % | HEART RATE: 72 BPM | SYSTOLIC BLOOD PRESSURE: 126 MMHG | BODY MASS INDEX: 24.22 KG/M2 | WEIGHT: 164 LBS

## 2021-05-03 DIAGNOSIS — E55.9 VITAMIN D DEFICIENCY: Primary | ICD-10-CM

## 2021-05-03 DIAGNOSIS — E11.8 TYPE 2 DIABETES MELLITUS WITH COMPLICATION (HCC): ICD-10-CM

## 2021-05-03 LAB — HBA1C MFR BLD: 9.4 %

## 2021-05-03 PROCEDURE — 83036 HEMOGLOBIN GLYCOSYLATED A1C: CPT | Performed by: INTERNAL MEDICINE

## 2021-05-03 PROCEDURE — 99214 OFFICE O/P EST MOD 30 MIN: CPT | Performed by: INTERNAL MEDICINE

## 2021-05-03 RX ORDER — INSULIN GLARGINE 100 [IU]/ML
12 INJECTION, SOLUTION SUBCUTANEOUS NIGHTLY
Qty: 15 PEN | Refills: 3
Start: 2021-05-03 | End: 2022-01-21 | Stop reason: SDUPTHER

## 2021-05-03 NOTE — PROGRESS NOTES
[address] [place] [address] Department of Endocrinology Diabetes and Metabolism   [address] [facility] [address]   Phone: [phone]  Fax: [phone]      Date of Service: 5/3/2021  Primary Care Physician: Romelia Clifford MD.  Provider: Qasim Cummings MD          Reason for the visit:  DM type 2     History of Present Illness: The history is provided by the patient. No  was used. Accuracy of the patient data is excellent. Migue Knight is a very pleasant 63 y.o. female seen today for follow up visit     Migue Knight was diagnosed with diabetes at age 47  and currently on Januvia 100 mg daily, Metformin 1000 mg BID, Glimepiride 4 mg BID, Basaglar 12 units daily   The patient has been checking blood sugar daily in the morning and reported that her BG AROUND 150. Her A1c was high today 9.4%   Lab Results   Component Value Date    LABA1C 9.4 05/03/2021    LABA1C 9.6 02/22/2021    LABA1C 9.5 09/16/2020     Patient has had no hypoglycemic episodes   Had diabetes class in the past but hasn't been mindful of what has been eating and wasn't strictly following diabetes diet as encouraged   I reviewed current medications and the patient has no issues with diabetes medications  The patient is up to date with eye exam and denied any h/o diabetic retinopathy. She is seeing podiatrist every 3 months and also performs her own foot care.  Last seen was last month   Microvascular complications:  No Retinopathy, Nephropathy or Neuropathy   Macrovascular complications: no CAD, PVD, or Stroke    PAST MEDICAL HISTORY   Past Medical History:   Diagnosis Date    Seizures ([facility] [address]     Type II or unspecified type diabetes mellitus without mention of complication, not stated as uncontrolled     Unspecified sleep apnea     stopped after weight loss     PAST SURGICAL HISTORY   Past Surgical History:   Procedure Laterality Date    HYSTERECTOMY       SOCIAL HISTORY   Tobacco:   reports that she has never smoked. She has never used smokeless tobacco.  Alcol:   reports current alcohol use. Illicit Drugs:   reports no history of drug use. FAMILY HISTORY   Family History   Problem Relation Age of Onset    Thyroid Disease Mother     High Blood Pressure Mother     Arthritis Mother     High Blood Pressure Father     High Blood Pressure Sister     Diabetes Maternal Uncle     Diabetes Paternal Aunt     Diabetes Maternal Grandmother     Cancer Maternal Grandfather     Thyroid Disease Sister      ALLERGIES AND DRUG REACTIONS   Allergies   Allergen Reactions    Sulfa Antibiotics Itching    Trulicity [Dulaglutide] Diarrhea and Nausea And Vomiting       CURRENT MEDICATIONS     Current Outpatient Medications   Medication Sig Dispense Refill    vitamin D (ERGOCALCIFEROL) 1.25 MG (83457 UT) CAPS capsule TAKE 1 CAPSULE ONCE A WEEK ON FRIDAY 12 capsule 3    glimepiride (AMARYL) 4 MG tablet take 1 tablet by mouth twice a day 28 tablet 5    Insulin Pen Needle (BD PEN NEEDLE JEFFERY 2ND GEN) 32G X 4 MM MISC CHECK three times a day 200 each 3    SITagliptin (JANUVIA) 100 MG tablet Take 1 tablet by mouth daily 90 tablet 3    metFORMIN (GLUCOPHAGE) 1000 MG tablet Take 1 tablet by mouth 2 times daily (with meals) 180 tablet 3    insulin glargine (BASAGLAR KWIKPEN) 100 UNIT/ML injection pen Inject 14 Units into the skin nightly (Patient taking differently: Inject 14 Units into the skin nightly 12 units pm) 10 pen 3    blood glucose monitor strips One-Touch Ultra strips. Test 2  times a day 200 strip 5    levETIRAcetam (KEPPRA) 750 MG tablet Take 1 tablet by mouth 2 times daily (Patient taking differently: Take 1,000 mg by mouth nightly ) 180 tablet 2     No current facility-administered medications for this visit. Review of Systems  Constitutional: No fever, no chills, no diaphoresis, no generalized weakness.   HEENT: No blurred vision, No sore throat, no ear pain, no hair loss  Neck: denied any neck swelling, difficulty swallowing,   Cardio-pulmonary: No CP, SOB or palpitation, No orthopnea or PND. No cough or wheezing. GI: No N/V/D, no constipation, No abdominal pain, no melena or hematochezia   : Denied any dysuria, hematuria, flank pain, discharge, or incontinence. Skin: denied any rash, ulcer, Hirsute, or hyperpigmentation. MSK: denied any joint deformity, joint pain/swelling, muscle pain, or back pain. Neuro: no numbness, no tingling, no weakness, _  OBJECTIVE    /68   Pulse 72   Resp 18   Wt 164 lb (74.4 kg)   SpO2 98%   BMI 24.22 kg/m²   BP Readings from Last 4 Encounters:   05/03/21 126/68   02/22/21 122/70   03/30/20 (!) 152/72   01/06/20 130/70     Wt Readings from Last 6 Encounters:   05/03/21 164 lb (74.4 kg)   02/22/21 65 lb (29.5 kg)   03/30/20 155 lb (70.3 kg)   01/06/20 157 lb 9.6 oz (71.5 kg)   08/26/19 163 lb 3.2 oz (74 kg)   04/29/19 164 lb 12.8 oz (74.8 kg)     Physical examination:  General: awake alert, oriented x3, no abnormal position or movements. HEENT: normocephalic non-traumatic, no exophthalmos   Neck: supple, no LN enlargement, no thyromegaly, no thyroid tenderness, no JVD. Pulm: Clear equal air entry no added sounds, no wheezing or rhonchi    CVS: S1 + S2, no murmur, no heave. Dorsalis pedis pulse palpable   Abd: soft lax, no tenderness, no organomegaly, audible bowel sounds.    Skin: warm, no lesions, no rash. + callus, no Ulcers, No acanthosis nigricans   Neuro: CN intact, Monofilament sensation decreased bilateral , muscle power normal  Psych: normal mood, and affect    Review of Laboratory Data:  I have reviewed the following:  Lab Results   Component Value Date/Time    WBC 3.5 (L) 09/16/2020 07:43 AM    RBC 3.52 09/16/2020 07:43 AM    HGB 10.1 (L) 09/16/2020 07:43 AM    HCT 33.5 (L) 09/16/2020 07:43 AM    MCV 95.2 09/16/2020 07:43 AM    MCH 28.7 09/16/2020 07:43 AM    MCHC 30.1 (L) 09/16/2020 07:43 AM    RDW 13.9 09/16/2020 07:43 AM     09/16/2020 control, blood pressure and weight    I personally reviewed external notes from PCP and other patient's care team providers, and personally interpreted labs associated with the above diagnosis. I also ordered labs to further assess and manage the above addressed medical conditions    Return in about 4 months (around 9/3/2021) for DM type 2, VitD deficiency. The above issues were reviewed with the patient who understood and agreed with the plan. Thank you for allowing us to participate in the care of this patient. Please do not hesitate to contact us with any additional questions. Diagnosis Orders   1. Vitamin D deficiency  Comprehensive Metabolic Panel    Vitamin D 25 Hydroxy   2. Type 2 diabetes mellitus with complication (HCC)  insulin glargine (BASAGLAR KWIKPEN) 100 UNIT/ML injection pen    POCT glycosylated hemoglobin (Hb A1C)    Comprehensive Metabolic Panel    Hemoglobin A1C    Lipid Panel    Microalbumin / Creatinine Urine Ratio    Ambulatory referral to Diabetic Education       Lord Corporal ANDERS  Endocrinologist, [facility])   [address] [place] [address], [address] [place] [address],[facility] [phone] [id number]   Phone: [phone]  Fax: [phone]  ------------------------  An electronic signature was used to authenticate this note.  Pancho Luo MD on 5/3/2021 at 4:07 PM

## 2021-05-26 ENCOUNTER — HOSPITAL ENCOUNTER (OUTPATIENT)
Dept: DIABETES SERVICES | Age: 61
Setting detail: THERAPIES SERIES
Discharge: HOME | End: 2021-05-26
Payer: COMMERCIAL

## 2021-05-26 VITALS — WEIGHT: 154 LBS | HEIGHT: 69 IN | BODY MASS INDEX: 22.81 KG/M2

## 2021-05-26 PROCEDURE — 97802 MEDICAL NUTRITION INDIV IN: CPT

## 2021-05-26 SDOH — ECONOMIC STABILITY: FOOD INSECURITY: ADDITIONAL INFORMATION: NO

## 2021-05-26 ASSESSMENT — PROBLEM AREAS IN DIABETES QUESTIONNAIRE (PAID)
PAID-5 TOTAL SCORE: 2
FEELING DEPRESSED WHEN YOU THINK ABOUT LIVING WITH DIABETES: 0
FEELING SCARED WHEN YOU THINK ABOUT LIVING WITH DIABETES: 1

## 2021-05-26 NOTE — PROGRESS NOTES
Diabetes Self-Management Education Record    Participant Name: David Osorio  Referring Provider: Talib Tucker MD  Assessment/Evaluation Ratings:  1=Needs Instruction   4=Demonstrates Understanding/Competency  2=Needs Review   NC=Not Covered    3=Comprehends Key Points  N/A=Not Applicable  Topics/Learning Objectives Pre-session Assess Date:  Instructor initials/date  5/26/2021 CS Instr. Date    Instructor initials/date  5/26/2021 CS Follow-up Post- session Eval Comments   Diabetes disease process & Treatment process:   -Define type of diabetes in simple terms.  - Describe the ABCs of  diabetes management  -Identify own type of diabetes  -Identify lifestyle changes/treatment options  -other:  2 [x] All     []  []  []  []  []  4 5/26/2021 CS  Type 2, A1C 9.4% (5/3/2021)  Diabetes Education in the past    Developing strategies for Healthy coping/psychosocial issues:    -Describe feelings about living with diabetes  -Identify coping strategies and sources of stress  -Identify support needed & support network available  -Complete PAID-5 Diabetes questionnaire 2 [x] All     []  []  []    []  4 5/26/2021 CS  Pt states  as support. David Osorio completed a Diabetes Self- Management Education Assessment on 5/26/21. Part of our assessment is having the patient complete the PAID (Problem Areas in Diabetes Scale)-5 survey. This tool  measures diabetes-related emotional distress a patient may be feeling. David Osorio scored _2_   A total score of >8 indicates possible diabetes related emotional distress, which warrants further assessment and a referral to mental health professional for psychological support and treatment.             Prevention, detection & treatment of Chronic complications:    -Identify the prevention, detection and treatment for complications including immunizations, preventive eye, foot, dental and renal exams as indicated per the participant's duration of diabetes and health status.  -Define the natural course of diabetes and the relationship of blood glucose levels to long term complications of diabetes. 2 [x] All     []            []  4    Prevention, detection & treatment of acute complications:    -State the causes,signs & symptoms of hyper & hypoglycemia, and prevention & treatment strategies.   -Describe sick day guidelines  DKA /indications for ketone testing &  when to call physician  2 [] All     [x]      []    4 5/26/2021 CS  Pt states episode of hypoglycemia           -Identify severe weather/situation crisis  & diabetes supplies management  []      Using medications safely:   -State effects of diabetes medicines on blood glucose levels;  -List diabetes medication taken, action & side effects 2 [x] All     []  []  4 5/26/2021 CS  Basaglar 12 units  Glimepiride 4 mg BID  Metformin 1000 mg BID  Januvia 100 mg daily   Insulin/Injectables/glucagon  -Name appropriate injection sites; proper storage; supplies needed;     []       Demonstrate proper technique  []      Monitoring blood glucose, interpreting and using results:   -Identify the purpose of testing   -Identify recommended & personal blood glucose targets & HgbA1C target levels  -State the Importance of logging blood glucose levels for pattern recognition;   -State benefits of reading/using pt generated health data  -Verbalize safe lancet disposal 2 [x] All     []  []    []  []  []  4 5/26/2021 CS  Pt states self monitors fasting - States - Pt states that fasting will be higher if she snacks after 8pm  A1c 9.4% (5/3/2021)    -Demonstrate proper testing technique  []      Incorporating physical activity into lifestyle:   -State effect of exercise on blood glucose levels;   -State benefits of regular exercise;   -Define safety considerations/food choices if needed.  -Describe contraindications/maintenance of activity.  2 [x] All     []  []    []  []  4 5/26/2021CS  Pt states that she walks 5-7 day/week for at least Safety precautions and no group session available.         Date:   Follow-up goal attainment based on patients initial DSMES goal    Dr Notified by [] EMR []Fax        []Post class Hgb A1C  []Medication compliance   []Plate method/meal plan compliance   []Able to state the number of Carbohydrate servings eaten at B,L,D   []Testing blood glucose as prescribed by PCP   []Exercise Routine   []Other:   []Other:     []Patient lost to follow-up  Dr Notified by []EMR []Fax     Personal Support Plan:      [x] Keep all scheduled doctor appointments   [] Make and keep appointments with specialists (foot, eye, dentist) as recommended   [] Consult my pharmacist about all new medications or to ask any medication questions   [] Get tested for sleep apnea   [] Seek help for:   [] Make an appointment with:   [] Attend smoking cessation classes or call 1-800-QUIT-NOW  [] Attend Diabetes Support Group   [] Use diabetes magazines, books, or credible web-sites like the ADA for more information  [] Increase exercise at home or join an exercise program:   [] Other: Abdomen soft, non-tender, no guarding.

## 2021-05-26 NOTE — LETTER
[facility]) - Diabetes Education    2021     Re:     Apryl Neri  :  1960    Dear Dr. Marty Villareal,     Thank you for referring your patient, Apryl Neri, to Diabetes Education Medical Nutrition Therapy. Your patient was here on 2021, and the following were addressed:        [x] Nutrition as Related to Diabetes    [x] Carbohydrate Counting     [x] Free Food List    [x] Combination Foods    [x] Fast Foods    [x] Weighing and measuring    [x] Meal Planning    [x] Using Food Labels - Label Reading  [] Diabetes Education Class Schedule    [x] Diet Instruction       [x]  Diet instructed provided on: 1800 calorie carbohydrate controlled meal plan:  6 oz lean protein, 7 servings fat, 13 carbohydrate choices/day: 3 choices breakfast, lunch and dinner, 2 choices am and pm snack. Upon completion of this session, the following evaluation of your patients progress was made:    ASSESSMENT:  [x]  verbalizes understanding of diet principles  [x]  understands the relationship between diet and health  [x]  identifies proper food choices  [x]  identifies needed diet changes  [x]  expresses intentions to comply with diet guidelines  [x]  able to provide correct answers when asked    GOAL:  [x]  to follow individual meal plan  [x]  to weigh and measure food portions  [x]  to call with further questions  []  to attend diabetes education class sessions 1 and/or 2    PATIENT EDUCATION MATERIALS PROVIDED:  [x]  plate carb counting meal plan  []  low fat guidelines  []  carb counting sheet  []  low sodium guidelines  []  Other:             Patient is encouraged to call with further questions or call and re-schedule other sessions within a year from original date. Thank you for referring this patient to our program.  Please do not hesitate to call if you have any questions at ( (SEELSI or MARIA LUZ) or [phone] ([address] [place] [address]).         Sincerely,         Registered Dietitian Nutritionists:          [] VIJI SanchezES          []   Alvino Members MS VIJI LOVE   [x]  VIJI Stovall  []   VIJI Corley [facility]           Diabetes

## 2021-10-07 ENCOUNTER — HOSPITAL ENCOUNTER (OUTPATIENT)
Age: 61
Discharge: HOME | End: 2021-10-07
Payer: COMMERCIAL

## 2021-10-07 LAB
ALBUMIN SERPL-MCNC: 4.2 G/DL (ref 3.5–5.2)
ALP BLD-CCNC: 49 U/L (ref 35–104)
ALT SERPL-CCNC: 8 U/L (ref 0–32)
ANION GAP SERPL CALCULATED.3IONS-SCNC: 12 MMOL/L (ref 7–16)
AST SERPL-CCNC: 13 U/L (ref 0–31)
BASOPHILS ABSOLUTE: 0.02 E9/L (ref 0–0.2)
BASOPHILS RELATIVE PERCENT: 0.6 % (ref 0–2)
BILIRUB SERPL-MCNC: 0.8 MG/DL (ref 0–1.2)
BUN BLDV-MCNC: 9 MG/DL (ref 6–23)
CALCIUM SERPL-MCNC: 9.2 MG/DL (ref 8.6–10.2)
CHLORIDE BLD-SCNC: 104 MMOL/L (ref 98–107)
CO2: 26 MMOL/L (ref 22–29)
CREAT SERPL-MCNC: 0.7 MG/DL (ref 0.5–1)
CREATININE URINE: 151 MG/DL (ref 29–226)
EOSINOPHILS ABSOLUTE: 0.03 E9/L (ref 0.05–0.5)
EOSINOPHILS RELATIVE PERCENT: 0.9 % (ref 0–6)
GFR AFRICAN AMERICAN: >60
GFR NON-AFRICAN AMERICAN: >60 ML/MIN/1.73
GLUCOSE BLD-MCNC: 106 MG/DL (ref 74–99)
HBA1C MFR BLD: 9.1 % (ref 4–5.6)
HCT VFR BLD CALC: 30.7 % (ref 34–48)
HEMOGLOBIN: 9.3 G/DL (ref 11.5–15.5)
IMMATURE GRANULOCYTES #: 0.01 E9/L
IMMATURE GRANULOCYTES %: 0.3 % (ref 0–5)
LYMPHOCYTES ABSOLUTE: 0.85 E9/L (ref 1.5–4)
LYMPHOCYTES RELATIVE PERCENT: 26.6 % (ref 20–42)
MCH RBC QN AUTO: 27.4 PG (ref 26–35)
MCHC RBC AUTO-ENTMCNC: 30.3 % (ref 32–34.5)
MCV RBC AUTO: 90.3 FL (ref 80–99.9)
MICROALBUMIN UR-MCNC: 103.2 MG/L
MICROALBUMIN/CREAT UR-RTO: 68.3 (ref 0–30)
MONOCYTES ABSOLUTE: 0.26 E9/L (ref 0.1–0.95)
MONOCYTES RELATIVE PERCENT: 8.1 % (ref 2–12)
NEUTROPHILS ABSOLUTE: 2.03 E9/L (ref 1.8–7.3)
NEUTROPHILS RELATIVE PERCENT: 63.5 % (ref 43–80)
PDW BLD-RTO: 15.5 FL (ref 11.5–15)
PLATELET # BLD: 315 E9/L (ref 130–450)
PMV BLD AUTO: 9.9 FL (ref 7–12)
POTASSIUM SERPL-SCNC: 3.9 MMOL/L (ref 3.5–5)
RBC # BLD: 3.4 E12/L (ref 3.5–5.5)
SODIUM BLD-SCNC: 142 MMOL/L (ref 132–146)
TOTAL PROTEIN: 6.8 G/DL (ref 6.4–8.3)
VITAMIN D 25-HYDROXY: 41 NG/ML (ref 30–100)
WBC # BLD: 3.2 E9/L (ref 4.5–11.5)

## 2021-10-07 PROCEDURE — 83036 HEMOGLOBIN GLYCOSYLATED A1C: CPT

## 2021-10-07 PROCEDURE — 85025 COMPLETE CBC W/AUTO DIFF WBC: CPT

## 2021-10-07 PROCEDURE — 82306 VITAMIN D 25 HYDROXY: CPT

## 2021-10-07 PROCEDURE — 82570 ASSAY OF URINE CREATININE: CPT

## 2021-10-07 PROCEDURE — 82044 UR ALBUMIN SEMIQUANTITATIVE: CPT

## 2021-10-07 PROCEDURE — 80053 COMPREHEN METABOLIC PANEL: CPT

## 2021-10-07 PROCEDURE — 36415 COLL VENOUS BLD VENIPUNCTURE: CPT

## 2021-10-19 DIAGNOSIS — E11.8 TYPE 2 DIABETES MELLITUS WITH COMPLICATION (HCC): ICD-10-CM

## 2021-10-19 RX ORDER — SITAGLIPTIN 100 MG/1
TABLET, FILM COATED ORAL
Qty: 90 TABLET | Refills: 3 | Status: SHIPPED
Start: 2021-10-19 | End: 2022-09-26 | Stop reason: SDUPTHER

## 2021-12-01 ENCOUNTER — HOSPITAL ENCOUNTER (OUTPATIENT)
Age: 61
Discharge: HOME | End: 2021-12-01
Payer: COMMERCIAL

## 2021-12-01 LAB
BASOPHILS ABSOLUTE: 0.02 E9/L (ref 0–0.2)
BASOPHILS RELATIVE PERCENT: 0.6 % (ref 0–2)
EOSINOPHILS ABSOLUTE: 0.04 E9/L (ref 0.05–0.5)
EOSINOPHILS RELATIVE PERCENT: 1.3 % (ref 0–6)
HCT VFR BLD CALC: 32.5 % (ref 34–48)
HEMOGLOBIN: 9.8 G/DL (ref 11.5–15.5)
IMMATURE GRANULOCYTES #: 0.01 E9/L
IMMATURE GRANULOCYTES %: 0.3 % (ref 0–5)
LYMPHOCYTES ABSOLUTE: 1 E9/L (ref 1.5–4)
LYMPHOCYTES RELATIVE PERCENT: 32.4 % (ref 20–42)
MCH RBC QN AUTO: 27.8 PG (ref 26–35)
MCHC RBC AUTO-ENTMCNC: 30.2 % (ref 32–34.5)
MCV RBC AUTO: 92.1 FL (ref 80–99.9)
MONOCYTES ABSOLUTE: 0.25 E9/L (ref 0.1–0.95)
MONOCYTES RELATIVE PERCENT: 8.1 % (ref 2–12)
NEUTROPHILS ABSOLUTE: 1.77 E9/L (ref 1.8–7.3)
NEUTROPHILS RELATIVE PERCENT: 57.3 % (ref 43–80)
PDW BLD-RTO: 15.1 FL (ref 11.5–15)
PLATELET # BLD: 322 E9/L (ref 130–450)
PMV BLD AUTO: 9.8 FL (ref 7–12)
RBC # BLD: 3.53 E12/L (ref 3.5–5.5)
WBC # BLD: 3.1 E9/L (ref 4.5–11.5)

## 2021-12-01 PROCEDURE — 36415 COLL VENOUS BLD VENIPUNCTURE: CPT

## 2021-12-01 PROCEDURE — 85025 COMPLETE CBC W/AUTO DIFF WBC: CPT

## 2022-01-21 ENCOUNTER — VIRTUAL VISIT (OUTPATIENT)
Dept: ENDOCRINOLOGY | Age: 62
End: 2022-01-21
Payer: COMMERCIAL

## 2022-01-21 DIAGNOSIS — E55.9 VITAMIN D DEFICIENCY: ICD-10-CM

## 2022-01-21 DIAGNOSIS — E07.9 THYROID DYSFUNCTION: Primary | ICD-10-CM

## 2022-01-21 DIAGNOSIS — E11.8 TYPE 2 DIABETES MELLITUS WITH COMPLICATION (HCC): ICD-10-CM

## 2022-01-21 DIAGNOSIS — Z91.119 DIETARY NONCOMPLIANCE: ICD-10-CM

## 2022-01-21 PROCEDURE — 99214 OFFICE O/P EST MOD 30 MIN: CPT | Performed by: INTERNAL MEDICINE

## 2022-01-21 RX ORDER — INSULIN GLARGINE 100 [IU]/ML
12 INJECTION, SOLUTION SUBCUTANEOUS NIGHTLY
Qty: 15 PEN | Refills: 3 | Status: SHIPPED
Start: 2022-01-21 | End: 2022-09-26 | Stop reason: SDUPTHER

## 2022-01-21 NOTE — PROGRESS NOTES
[address] [place] [address] Department of Endocrinology Diabetes and Metabolism   [address] [facility] [address]   Phone: [phone]  Fax: [phone]      Date of Service: 1/21/2022  Primary Care Physician: Kaia Lennon MD.  Provider: Minoo Willingham MD          Reason for the visit:  DM type 2     History of Present Illness: The history is provided by the patient. No  was used. Accuracy of the patient data is excellent. Deja Beltre is a very pleasant 57 y.o. female seen today for follow up visit     Deja Beltre was diagnosed with diabetes at age 47  and currently on Januvia 100 mg daily, Metformin 1000 mg BID, Glimepiride 4 mg BID, Basaglar 12 units daily  Pt admit poor compliance with diet recently   Lab Results   Component Value Date    LABA1C 9.1 10/07/2021    LABA1C 9.4 05/03/2021    LABA1C 9.6 02/22/2021     Patient has had no hypoglycemic episodes   Had diabetes class in the past but hasn't been mindful of what has been eating and wasn't strictly following diabetes diet as encouraged   I reviewed current medications and the patient has no issues with diabetes medications  The patient is up to date with eye exam and denied any h/o diabetic retinopathy. She is seeing podiatrist every 3 months and also performs her own foot care. Last seen was last month   Microvascular complications:  No Retinopathy, Nephropathy or Neuropathy   Macrovascular complications: no CAD, PVD, or Stroke    PAST MEDICAL HISTORY   Past Medical History:   Diagnosis Date    Seizures ([facility] [address]     Type II or unspecified type diabetes mellitus without mention of complication, not stated as uncontrolled     Unspecified sleep apnea     stopped after weight loss     PAST SURGICAL HISTORY   Past Surgical History:   Procedure Laterality Date    HYSTERECTOMY       SOCIAL HISTORY   Tobacco:   reports that she has never smoked.  She has never used smokeless tobacco.  Alcol:   reports current alcohol use. Illicit Drugs:   reports no history of drug use. FAMILY HISTORY   Family History   Problem Relation Age of Onset    Thyroid Disease Mother     High Blood Pressure Mother     Arthritis Mother     High Blood Pressure Father     High Blood Pressure Sister     Diabetes Maternal Uncle     Diabetes Paternal Aunt     Diabetes Maternal Grandmother     Cancer Maternal Grandfather     Thyroid Disease Sister      ALLERGIES AND DRUG REACTIONS   Allergies   Allergen Reactions    Sulfa Antibiotics Itching    Trulicity [Dulaglutide] Diarrhea and Nausea And Vomiting       CURRENT MEDICATIONS     Current Outpatient Medications   Medication Sig Dispense Refill    insulin glargine (BASAGLAR KWIKPEN) 100 UNIT/ML injection pen Inject 12 Units into the skin nightly 12 units pm 15 pen 3    vitamin D (ERGOCALCIFEROL) 1.25 MG (00219 UT) CAPS capsule TAKE 1 CAPSULE ONCE A WEEK ON FRIDAY 12 capsule 0    BD PEN NEEDLE JEFFERY 2ND GEN 32G X 4 MM MISC CHECK three times a day 200 each 5    JANUVIA 100 MG tablet TAKE 1 TABLET DAILY 90 tablet 3    metFORMIN (GLUCOPHAGE) 1000 MG tablet TAKE 1 TABLET TWICE A DAY WITH MEALS 180 tablet 3    glimepiride (AMARYL) 4 MG tablet take 1 tablet by mouth twice a day 28 tablet 5    blood glucose monitor strips One-Touch Ultra strips. Test 2  times a day 200 strip 5    levETIRAcetam (KEPPRA) 750 MG tablet Take 1 tablet by mouth 2 times daily (Patient taking differently: Take 1,000 mg by mouth nightly ) 180 tablet 2     No current facility-administered medications for this visit. Review of Systems  Constitutional: No fever, no chills, no diaphoresis, no generalized weakness. HEENT: No blurred vision, No sore throat, no ear pain, no hair loss  Neck: denied any neck swelling, difficulty swallowing,   Cardio-pulmonary: No CP, SOB or palpitation, No orthopnea or PND. No cough or wheezing.   GI: No N/V/D, no constipation, No abdominal pain, no melena or hematochezia   : Denied any dysuria, hematuria, flank pain, discharge, or incontinence. Skin: denied any rash, ulcer, Hirsute, or hyperpigmentation. MSK: denied any joint deformity, joint pain/swelling, muscle pain, or back pain. Neuro: no numbness, no tingling, no weakness, _  OBJECTIVE    There were no vitals taken for this visit. BP Readings from Last 4 Encounters:   05/03/21 126/68   02/22/21 122/70   03/30/20 (!) 152/72   01/06/20 130/70     Wt Readings from Last 6 Encounters:   05/26/21 154 lb (69.9 kg)   05/03/21 164 lb (74.4 kg)   02/22/21 65 lb (29.5 kg)   03/30/20 155 lb (70.3 kg)   01/06/20 157 lb 9.6 oz (71.5 kg)   08/26/19 163 lb 3.2 oz (74 kg)     Physical examination:  General: awake alert, oriented x3, no abnormal position or movements. HEENT: normocephalic non-traumatic, no exophthalmos   Neck: supple, no LN enlargement, no thyromegaly, no thyroid tenderness, no JVD. Pulm: Clear equal air entry no added sounds, no wheezing or rhonchi    CVS: S1 + S2, no murmur, no heave. Dorsalis pedis pulse palpable   Abd: soft lax, no tenderness, no organomegaly, audible bowel sounds.    Skin: warm, no lesions, no rash. + callus, no Ulcers, No acanthosis nigricans   Neuro: CN intact, Monofilament sensation decreased bilateral , muscle power normal  Psych: normal mood, and affect    Review of Laboratory Data:  I have reviewed the following:  Lab Results   Component Value Date/Time    WBC 3.1 (L) 12/01/2021 07:20 AM    RBC 3.53 12/01/2021 07:20 AM    HGB 9.8 (L) 12/01/2021 07:20 AM    HCT 32.5 (L) 12/01/2021 07:20 AM    MCV 92.1 12/01/2021 07:20 AM    MCH 27.8 12/01/2021 07:20 AM    MCHC 30.2 (L) 12/01/2021 07:20 AM    RDW 15.1 (H) 12/01/2021 07:20 AM     12/01/2021 07:20 AM    MPV 9.8 12/01/2021 07:20 AM      Lab Results   Component Value Date/Time     10/07/2021 07:40 AM    K 3.9 10/07/2021 07:40 AM    CO2 26 10/07/2021 07:40 AM    BUN 9 10/07/2021 07:40 AM    CALCIUM 9.2 10/07/2021 07:40 AM      Lab Results Component Value Date    LABA1C 9.1 10/07/2021    GLUCOSE 106 10/07/2021    MALBCR 68.3 10/07/2021    LABMICR 103.2 10/07/2021    LABCREA 151 10/07/2021     Lab Results   Component Value Date    CHOL 175 08/21/2019    CHOL 156 04/27/2019    CHOL 181 07/07/2018    CHOL 142 01/23/2017    TRIG 72 08/21/2019    TRIG 46 04/27/2019    TRIG 70 07/07/2018    TRIG 60 01/23/2017    HDL 57 08/21/2019    HDL 63 04/27/2019    HDL 55 07/07/2018    HDL 48 01/23/2017     Lab Results   Component Value Date    VITD25 41 10/07/2021    VITD25 45 09/16/2020    VITD25 42 07/07/2018    VITD25 36 01/23/2017     ASSESSMENT & RECOMMENDATIONS   Jonny Pallas, a 57 y.o.-old female seen in for the following issues     Diabetes Mellitus Type 2    · Uncontrolled due to poor compliance with diet   · Pt still hesistant to start short acting insulin with meals    · Take Basaglar 12 units daily at bedtime, Glimepiride 4 mg BID, Metformin 1000 mg BID, Januvia 100 mg daily, Jardiance 25 mg daily   · Advised to follow diabetes diet and send us sugar in a wk   · Wasn't able to tolerate GLP-1 agonist in the past   · Discussed with patient A1c and blood sugar goals     Dry skin/itching   · Pt reported strong FH of hypothyroidism  · Check TFT     vitD deficiency   · Continue vitD supplements   · Check vitD level before next visit      Dietary noncompliance   Discussed with patient the importance of eating consistent carbohydrate meals, avoiding high glycemic index food. Also, discussed with patient the risk and negative consequences of dietary noncompliance on blood glucose control, blood pressure and weight    I personally reviewed external notes from PCP and other patient's care team providers, and personally interpreted labs associated with the above diagnosis. I also ordered labs to further assess and manage the above addressed medical conditions    Return in about 4 months (around 5/21/2022) for DM type 2, VitD deficiency.      The above issues were reviewed with the patient who understood and agreed with the plan. Thank you for allowing us to participate in the care of this patient. Please do not hesitate to contact us with any additional questions. Diagnosis Orders   1. Thyroid dysfunction  TSH without Reflex    T4, Free   2. Type 2 diabetes mellitus with complication (HCC)  insulin glargine (BASAGLAR KWIKPEN) 100 UNIT/ML injection pen   3. Vitamin D deficiency     4. Dietary noncompliance         Mallorie English MD  Endocrinologist, [facility])   [address] [place] [address], [address] [place] [address],[facility] [phone]   Phone: [phone]  Fax: [phone]  ------------------------  An electronic signature was used to authenticate this note.  Ifeoma Bernal MD on 1/21/2022 at 7:59 PM

## 2022-01-21 NOTE — PROGRESS NOTES
Alejandro Alcazar was read the following message We want to confirm that, for purposes of billing, this is a virtual visit with your provider for which we will submit a claim for reimbursement with your insurance company. You will be responsible for any copays, coinsurance amounts or other amounts not covered by your insurance company. If you do not accept this, unfortunately we will not be able to schedule a virtual visit with the provider. Do you accept?  Woody Galindo responded YES

## 2022-04-07 DIAGNOSIS — E55.9 VITAMIN D DEFICIENCY: ICD-10-CM

## 2022-04-07 RX ORDER — ERGOCALCIFEROL 1.25 MG/1
CAPSULE ORAL
Qty: 12 CAPSULE | Refills: 3 | Status: SHIPPED | OUTPATIENT
Start: 2022-04-07

## 2022-04-11 DIAGNOSIS — E11.8 TYPE 2 DIABETES MELLITUS WITH COMPLICATION (HCC): ICD-10-CM

## 2022-04-11 RX ORDER — GLIMEPIRIDE 4 MG/1
TABLET ORAL
Qty: 180 TABLET | Refills: 5 | Status: SHIPPED
Start: 2022-04-11 | End: 2022-09-26 | Stop reason: SDUPTHER

## 2022-04-11 RX ORDER — GLIMEPIRIDE 4 MG/1
TABLET ORAL
Qty: 180 TABLET | Refills: 5 | Status: SHIPPED
Start: 2022-04-11 | End: 2022-04-11

## 2022-07-13 DIAGNOSIS — E11.8 TYPE 2 DIABETES MELLITUS WITH COMPLICATION (HCC): ICD-10-CM

## 2022-07-13 RX ORDER — PEN NEEDLE, DIABETIC 32GX 5/32"
NEEDLE, DISPOSABLE MISCELLANEOUS
Qty: 200 EACH | Refills: 0 | Status: SHIPPED
Start: 2022-07-13 | End: 2022-07-13 | Stop reason: SDUPTHER

## 2022-07-13 RX ORDER — PEN NEEDLE, DIABETIC 32GX 5/32"
NEEDLE, DISPOSABLE MISCELLANEOUS
Qty: 200 EACH | Refills: 5 | Status: SHIPPED | OUTPATIENT
Start: 2022-07-13

## 2022-08-24 ENCOUNTER — FOLLOWUP TELEPHONE ENCOUNTER (OUTPATIENT)
Dept: ENDOCRINOLOGY | Age: 62
End: 2022-08-24

## 2022-08-24 ENCOUNTER — OFFICE VISIT (OUTPATIENT)
Dept: ENDOCRINOLOGY | Age: 62
End: 2022-08-24
Payer: COMMERCIAL

## 2022-08-24 VITALS
DIASTOLIC BLOOD PRESSURE: 67 MMHG | HEIGHT: 69 IN | HEART RATE: 78 BPM | WEIGHT: 154 LBS | BODY MASS INDEX: 22.81 KG/M2 | SYSTOLIC BLOOD PRESSURE: 138 MMHG

## 2022-08-24 DIAGNOSIS — E11.8 TYPE 2 DIABETES MELLITUS WITH COMPLICATION (HCC): Primary | ICD-10-CM

## 2022-08-24 DIAGNOSIS — Z91.119 DIETARY NONCOMPLIANCE: ICD-10-CM

## 2022-08-24 DIAGNOSIS — E55.9 VITAMIN D DEFICIENCY: ICD-10-CM

## 2022-08-24 LAB — HBA1C MFR BLD: 12.3 %

## 2022-08-24 PROCEDURE — 3046F HEMOGLOBIN A1C LEVEL >9.0%: CPT | Performed by: NURSE PRACTITIONER

## 2022-08-24 PROCEDURE — 99214 OFFICE O/P EST MOD 30 MIN: CPT | Performed by: NURSE PRACTITIONER

## 2022-08-24 PROCEDURE — 83036 HEMOGLOBIN GLYCOSYLATED A1C: CPT | Performed by: NURSE PRACTITIONER

## 2022-08-24 NOTE — TELEPHONE ENCOUNTER
Patient pleasant and receptive to diabetes education. She has met with a dietitian in the past and states she is still unsure what to eat at meals. Per diet recall, patient eats 3 meals/day and grazes on snacks at work between meals. Diet is lacking in protein and fiber, majority of her carb choices are simple/refined and high in sugar. Reviewed plate method and importance of balanced meals. Suggested 2-3 servings of carbs per meal and 1-2 servings per snack. Discussed portion sizes and benefits of measuring food. Educated on reading labels to plan meals based on carb needs. Provided list of meal and snack ideas for BS control. Patient agreeable to suggestions. F/u as needed.

## 2022-08-24 NOTE — PROGRESS NOTES
[address] [place] [address] Department of Endocrinology Diabetes and Metabolism   [address] [facility] [address]   Phone: [phone]  Fax: [phone]      Date of Service: 8/24/2022  Primary Care Physician: Shari Cleary MD.  Provider: RENETTA Andrade NP         Reason for the visit:  DM type 2     History of Present Illness: The history is provided by the patient. No  was used. Accuracy of the patient data is excellent. Eddie Turk is a very pleasant 57 y.o. female seen today for follow up visit     Eddie Turk was diagnosed with diabetes at age 47  and currently on Januvia 100 mg daily, Metformin 1000 mg BID, Glimepiride 4 mg BID, Basaglar 12 units daily    Not tolerating Metformin in the AM - has been not taking it     She is checking her BS daily AVG   Pt admit poor compliance with diet recently   Lab Results   Component Value Date/Time    LABA1C 12.3 08/24/2022 08:10 AM    LABA1C 9.1 10/07/2021 07:40 AM    LABA1C 9.4 05/03/2021 04:10 PM     Patient has had no hypoglycemic episodes   Had diabetes class in the past but hasn't been mindful of what has been eating and wasn't strictly following diabetes diet as encouraged   She eats 3 meals a day and snacks all day   I reviewed current medications and the patient has no issues with diabetes medications  The patient is up to date with eye exam and denied any h/o diabetic retinopathy. She is seeing podiatrist every 3 months and also performs her own foot care.  Last seen was last month   Microvascular complications:  No Retinopathy, Nephropathy or Neuropathy   Macrovascular complications: no CAD, PVD, or Stroke    PAST MEDICAL HISTORY   Past Medical History:   Diagnosis Date    Seizures ([address]     Type II or unspecified type diabetes mellitus without mention of complication, not stated as uncontrolled     Unspecified sleep apnea     stopped after weight loss     PAST SURGICAL HISTORY   Past Surgical History:   Procedure Laterality Date    HYSTERECTOMY (CERVIX STATUS UNKNOWN)       SOCIAL HISTORY   Tobacco:   reports that she has never smoked. She has never used smokeless tobacco.  Alcol:   reports current alcohol use. Illicit Drugs:   reports no history of drug use. FAMILY HISTORY   Family History   Problem Relation Age of Onset    Thyroid Disease Mother     High Blood Pressure Mother     Arthritis Mother     High Blood Pressure Father     High Blood Pressure Sister     Diabetes Maternal Uncle     Diabetes Paternal Aunt     Diabetes Maternal Grandmother     Cancer Maternal Grandfather     Thyroid Disease Sister      ALLERGIES AND DRUG REACTIONS   Allergies   Allergen Reactions    Sulfa Antibiotics Itching    Trulicity [Dulaglutide] Diarrhea and Nausea And Vomiting       CURRENT MEDICATIONS     Current Outpatient Medications   Medication Sig Dispense Refill    glimepiride (AMARYL) 4 MG tablet TAKE 1 TABLET TWICE A  tablet 5    vitamin D (ERGOCALCIFEROL) 1.25 MG (25785 UT) CAPS capsule TAKE 1 CAPSULE ONCE A WEEK ON FRIDAY 12 capsule 3    insulin glargine (BASAGLAR KWIKPEN) 100 UNIT/ML injection pen Inject 12 Units into the skin nightly 12 units pm 15 pen 3    JANUVIA 100 MG tablet TAKE 1 TABLET DAILY 90 tablet 3    Insulin Pen Needle (BD PEN NEEDLE JEFFERY 2ND GEN) 32G X 4 MM MISC To use with insulin pens 200 each 5    metFORMIN (GLUCOPHAGE) 1000 MG tablet TAKE 1 TABLET TWICE A DAY WITH MEALS (Patient not taking: Reported on 8/24/2022) 180 tablet 3    blood glucose monitor strips One-Touch Ultra strips. Test 2  times a day 200 strip 5    levETIRAcetam (KEPPRA) 750 MG tablet Take 1 tablet by mouth 2 times daily (Patient taking differently: Take 1,000 mg by mouth nightly ) 180 tablet 2     No current facility-administered medications for this visit. Review of Systems  Constitutional: No fever, no chills, no diaphoresis, no generalized weakness.   HEENT: No blurred vision, No sore throat, no ear pain, no hair loss  Neck: denied any neck swelling, difficulty swallowing,   Cardio-pulmonary: No CP, SOB or palpitation, No orthopnea or PND. No cough or wheezing. GI: No N/V/D, no constipation, No abdominal pain, no melena or hematochezia   : Denied any dysuria, hematuria, flank pain, discharge, or incontinence. Skin: denied any rash, ulcer, Hirsute, or hyperpigmentation. MSK: denied any joint deformity, joint pain/swelling, muscle pain, or back pain. Neuro: no numbness, no tingling, no weakness    OBJECTIVE    /67   Pulse 78   Ht 5' 9\" (1.753 m)   Wt 154 lb (69.9 kg)   BMI 22.74 kg/m²   BP Readings from Last 4 Encounters:   08/24/22 138/67   05/03/21 126/68   02/22/21 122/70   03/30/20 (!) 152/72     Wt Readings from Last 6 Encounters:   08/24/22 154 lb (69.9 kg)   05/26/21 154 lb (69.9 kg)   05/03/21 164 lb (74.4 kg)   02/22/21 65 lb (29.5 kg)   03/30/20 155 lb (70.3 kg)   01/06/20 157 lb 9.6 oz (71.5 kg)     Physical examination:  General: awake alert, oriented x3, no abnormal position or movements. HEENT: normocephalic non-traumatic, no exophthalmos   Neck: supple, no LN enlargement, no thyromegaly, no thyroid tenderness, no JVD. Pulm: Clear equal air entry no added sounds, no wheezing or rhonchi    CVS: S1 + S2, no murmur, no heave. Dorsalis pedis pulse palpable   Abd: soft lax, no tenderness, no organomegaly, audible bowel sounds.    Skin: warm, no lesions, no rash. + callus, no Ulcers, No acanthosis nigricans   Neuro: CN intact,  sensation decreased bilateral , muscle power normal  Psych: normal mood, and affect    Review of Laboratory Data:  I have reviewed the following:  Lab Results   Component Value Date/Time    WBC 3.1 (L) 12/01/2021 07:20 AM    RBC 3.53 12/01/2021 07:20 AM    HGB 9.8 (L) 12/01/2021 07:20 AM    HCT 32.5 (L) 12/01/2021 07:20 AM    MCV 92.1 12/01/2021 07:20 AM    MCH 27.8 12/01/2021 07:20 AM    MCHC 30.2 (L) 12/01/2021 07:20 AM    RDW 15.1 (H) 12/01/2021 07:20 AM     12/01/2021 07:20 AM    MPV 9.8 12/01/2021 07:20 AM      Lab Results   Component Value Date/Time     10/07/2021 07:40 AM    K 3.9 10/07/2021 07:40 AM    CO2 26 10/07/2021 07:40 AM    BUN 9 10/07/2021 07:40 AM    CALCIUM 9.2 10/07/2021 07:40 AM      Lab Results   Component Value Date/Time    LABA1C 12.3 08/24/2022 08:10 AM    GLUCOSE 106 10/07/2021 07:40 AM    MALBCR 68.3 10/07/2021 07:41 AM    LABMICR 103.2 10/07/2021 07:41 AM    LABCREA 151 10/07/2021 07:41 AM     Lab Results   Component Value Date/Time    CHOL 175 08/21/2019 07:41 AM    CHOL 156 04/27/2019 10:19 AM    CHOL 181 07/07/2018 10:51 AM    CHOL 142 01/23/2017 11:05 AM    TRIG 72 08/21/2019 07:41 AM    TRIG 46 04/27/2019 10:19 AM    TRIG 70 07/07/2018 10:51 AM    TRIG 60 01/23/2017 11:05 AM    HDL 57 08/21/2019 07:41 AM    HDL 63 04/27/2019 10:19 AM    HDL 55 07/07/2018 10:51 AM    HDL 48 01/23/2017 11:05 AM     Lab Results   Component Value Date/Time    VITD25 41 10/07/2021 07:40 AM    VITD25 45 09/16/2020 07:43 AM    VITD25 42 07/07/2018 10:51 AM    VITD25 36 01/23/2017 11:05 AM     ASSESSMENT & RECOMMENDATIONS   Al Bryan, a 57 y.o.-old female seen in for the following issues     Diabetes Mellitus Type 2    Uncontrolled due to poor compliance with diet   Pt still hesistant to start short acting insulin with meals  - but agreeable if BS are not controlled in 2 weeks   Take Basaglar 16 units daily at bedtime, Glimepiride 4 mg BID, restart Metformin to 500 am and 1000 mg in the PM, Januvia 100 mg daily  Does not want to start jardiance  Advised to follow diabetes diet and send us sugar in 1 wk -2 checking BS BID  Wasn't able to tolerate GLP-1 agonist in the past   Discussed with patient A1c and blood sugar goals     Vit D deficiency   Continue vitD supplements   Check vitD level before next visit      Dietary noncompliance  Discussed with patient the importance of eating consistent carbohydrate meals, avoiding high glycemic index food.  Also,

## 2022-09-26 DIAGNOSIS — E11.8 TYPE 2 DIABETES MELLITUS WITH COMPLICATION (HCC): ICD-10-CM

## 2022-09-26 RX ORDER — INSULIN GLARGINE 100 [IU]/ML
INJECTION, SOLUTION SUBCUTANEOUS
Qty: 2 ADJUSTABLE DOSE PRE-FILLED PEN SYRINGE | Refills: 3 | Status: SHIPPED
Start: 2022-09-26 | End: 2022-09-26 | Stop reason: SDUPTHER

## 2022-09-26 RX ORDER — INSULIN GLARGINE 100 [IU]/ML
INJECTION, SOLUTION SUBCUTANEOUS
Qty: 2 ADJUSTABLE DOSE PRE-FILLED PEN SYRINGE | Refills: 6 | Status: SHIPPED | OUTPATIENT
Start: 2022-09-26

## 2022-09-26 RX ORDER — GLIMEPIRIDE 4 MG/1
TABLET ORAL
Qty: 180 TABLET | Refills: 1 | Status: SHIPPED | OUTPATIENT
Start: 2022-09-26

## 2022-10-03 ENCOUNTER — TELEPHONE (OUTPATIENT)
Dept: ENDOCRINOLOGY | Age: 62
End: 2022-10-03

## 2022-10-03 NOTE — TELEPHONE ENCOUNTER
The patient called to let you know that when she raised her Basaglar to 16 units HS, she was waking up with blood sugars in the low 60's. She was more comfortable starting raising it to 13 and working her way up. She will do 13 qHS and send in her blood sugars in a week.

## 2022-10-18 ENCOUNTER — OFFICE VISIT (OUTPATIENT)
Dept: FAMILY MEDICINE CLINIC | Age: 62
End: 2022-10-18
Payer: COMMERCIAL

## 2022-10-18 VITALS
HEART RATE: 70 BPM | RESPIRATION RATE: 18 BRPM | WEIGHT: 161 LBS | DIASTOLIC BLOOD PRESSURE: 70 MMHG | TEMPERATURE: 97.6 F | SYSTOLIC BLOOD PRESSURE: 136 MMHG | HEIGHT: 69 IN | OXYGEN SATURATION: 98 % | BODY MASS INDEX: 23.85 KG/M2

## 2022-10-18 DIAGNOSIS — B34.9 VIRAL ILLNESS: Primary | ICD-10-CM

## 2022-10-18 DIAGNOSIS — Z20.822 EXPOSURE TO COVID-19 VIRUS: ICD-10-CM

## 2022-10-18 DIAGNOSIS — R05.1 ACUTE COUGH: ICD-10-CM

## 2022-10-18 LAB
Lab: NORMAL
PERFORMING INSTRUMENT: NORMAL
QC PASS/FAIL: NORMAL
SARS-COV-2, POC: NORMAL

## 2022-10-18 PROCEDURE — 99213 OFFICE O/P EST LOW 20 MIN: CPT | Performed by: NURSE PRACTITIONER

## 2022-10-18 PROCEDURE — 87426 SARSCOV CORONAVIRUS AG IA: CPT | Performed by: NURSE PRACTITIONER

## 2022-10-18 NOTE — PROGRESS NOTES
10/18/22  Rohit Coley : 1960 Sex: female  Age 57 y.o. Subjective:  Chief Complaint   Patient presents with    Cough     Dry cough x since Monday / exposure to covid last Thursday / nyquil did help       HPI:   Rohit Coley , 57 y.o. female presents to the clinic for evaluation of dry cough x 1 day. The patient also reports chronic rhintis. The patient has taken Nyquil for symptoms. The patient reports unchanged symptoms over time. The patient reports COVID-19 ill exposure 5 days ago. The patient denies hx of COVID-19. The patient denies acute loss of taste and smell, headache, sinus congestion, sore throat, rash, and fever. The patient also denies chest pain, abdominal pain, shortness of breath, and nausea / vomiting / diarrhea. ROS:   Unless otherwise stated in this report the patient's positive and negative responses for review of systems for constitutional, eyes, ENT, cardiovascular, respiratory, gastrointestinal, neurological, , musculoskeletal, and integument systems and related systems to the presenting problem are either stated in the history of present illness or were not pertinent or were negative for the symptoms and/or complaints related to the presenting medical problem. Positives and pertinent negatives as per HPI. All others reviewed and are negative.       PMH:     Past Medical History:   Diagnosis Date    Seizures ([address]     Type II or unspecified type diabetes mellitus without mention of complication, not stated as uncontrolled     Unspecified sleep apnea     stopped after weight loss       Past Surgical History:   Procedure Laterality Date    HYSTERECTOMY (CERVIX STATUS UNKNOWN)         Family History   Problem Relation Age of Onset    Thyroid Disease Mother     High Blood Pressure Mother     Arthritis Mother     High Blood Pressure Father     High Blood Pressure Sister     Diabetes Maternal Uncle     Diabetes Paternal Aunt     Diabetes Maternal Grandmother     Cancer Maternal Grandfather     Thyroid Disease Sister        Medications:     Current Outpatient Medications:     glimepiride (AMARYL) 4 MG tablet, Take 1 tablet twice a day, Disp: 180 tablet, Rfl: 1    SITagliptin (JANUVIA) 100 MG tablet, TAKE 1 TABLET DAILY, Disp: 90 tablet, Rfl: 1    insulin glargine (BASAGLAR KWIKPEN) 100 UNIT/ML injection pen, Inject 16 units at night, Disp: 2 Adjustable Dose Pre-filled Pen Syringe, Rfl: 6    Insulin Pen Needle (BD PEN NEEDLE JEFFERY 2ND GEN) 32G X 4 MM MISC, To use with insulin pens, Disp: 200 each, Rfl: 5    vitamin D (ERGOCALCIFEROL) 1.25 MG (22618 UT) CAPS capsule, TAKE 1 CAPSULE ONCE A WEEK ON FRIDAY, Disp: 12 capsule, Rfl: 3    metFORMIN (GLUCOPHAGE) 1000 MG tablet, TAKE 1 TABLET TWICE A DAY WITH MEALS, Disp: 180 tablet, Rfl: 3    blood glucose monitor strips, One-Touch Ultra strips. Test 2  times a day, Disp: 200 strip, Rfl: 5    levETIRAcetam (KEPPRA) 750 MG tablet, Take 1 tablet by mouth 2 times daily (Patient taking differently: Take 1,000 mg by mouth nightly), Disp: 180 tablet, Rfl: 2    Allergies: Allergies   Allergen Reactions    Sulfa Antibiotics Itching    Trulicity [Dulaglutide] Diarrhea and Nausea And Vomiting       Social History:     Social History     Tobacco Use    Smoking status: Never    Smokeless tobacco: Never   Substance Use Topics    Alcohol use: Yes     Alcohol/week: 0.0 standard drinks     Comment: social    Drug use: No       Physical Exam:     Vitals:    10/18/22 1303   BP: 136/70   Pulse: 70   Resp: 18   Temp: 97.6 °F (36.4 °C)   TempSrc: Temporal   SpO2: 98%   Weight: 161 lb (73 kg)   Height: 5' 9\" (1.753 m)       Physical Exam (PE)    Physical Exam  Constitutional:       Appearance: Normal appearance. HENT:      Head: Normocephalic. Right Ear: Tympanic membrane, ear canal and external ear normal.      Left Ear: Tympanic membrane, ear canal and external ear normal.      Nose: Rhinorrhea present.       Mouth/Throat:      Mouth: Mucous membranes are moist.      Pharynx: Oropharynx is clear. Eyes:      Pupils: Pupils are equal, round, and reactive to light. Cardiovascular:      Rate and Rhythm: Normal rate and regular rhythm. Pulses: Normal pulses. Heart sounds: Normal heart sounds. Pulmonary:      Effort: Pulmonary effort is normal.      Breath sounds: Normal breath sounds. No wheezing, rhonchi or rales. Abdominal:      General: Bowel sounds are normal.      Palpations: Abdomen is soft. Musculoskeletal:         General: Normal range of motion. Cervical back: Normal range of motion and neck supple. Lymphadenopathy:      Cervical: No cervical adenopathy. Skin:     General: Skin is warm and dry. Capillary Refill: Capillary refill takes less than 2 seconds. Neurological:      General: No focal deficit present. Mental Status: She is alert and oriented to person, place, and time. Psychiatric:         Mood and Affect: Mood normal.         Behavior: Behavior normal.        Testing:   (All laboratory and radiology results have been personally reviewed by myself)  Labs:  Results for orders placed or performed in visit on 10/18/22   POCT COVID-19, Antigen   Result Value Ref Range    SARS-COV-2, POC Not-Detected Not Detected    Lot Number [id number]     QC Pass/Fail pass     Performing Instrument BD Veritor        Imaging: All Radiology results interpreted by Radiologist unless otherwise noted. No orders to display       Assessment / Plan:   The patient's vitals, allergies, medications, and past medical history have been reviewed. Alonzo Jimenez was seen today for cough. Diagnoses and all orders for this visit:    Viral illness    Acute cough  -     POCT COVID-19, Antigen    Exposure to COVID-19 virus  -     POCT COVID-19, Antigen      - Disposition: Home    - Educational material printed for patient's review and were included in patient instructions. After Visit Summary was given to patient at the end of visit.     - Advised to

## 2022-11-22 ENCOUNTER — OFFICE VISIT (OUTPATIENT)
Dept: ENDOCRINOLOGY | Age: 62
End: 2022-11-22
Payer: COMMERCIAL

## 2022-11-22 VITALS
SYSTOLIC BLOOD PRESSURE: 116 MMHG | DIASTOLIC BLOOD PRESSURE: 63 MMHG | BODY MASS INDEX: 22.96 KG/M2 | HEIGHT: 69 IN | WEIGHT: 155 LBS | HEART RATE: 77 BPM

## 2022-11-22 DIAGNOSIS — Z91.119 DIETARY NONCOMPLIANCE: ICD-10-CM

## 2022-11-22 DIAGNOSIS — E11.65 TYPE 2 DIABETES MELLITUS WITH HYPERGLYCEMIA, WITH LONG-TERM CURRENT USE OF INSULIN (HCC): Primary | ICD-10-CM

## 2022-11-22 DIAGNOSIS — Z79.4 TYPE 2 DIABETES MELLITUS WITH HYPERGLYCEMIA, WITH LONG-TERM CURRENT USE OF INSULIN (HCC): Primary | ICD-10-CM

## 2022-11-22 DIAGNOSIS — E55.9 VITAMIN D DEFICIENCY: ICD-10-CM

## 2022-11-22 DIAGNOSIS — Z83.49 FAMILY HISTORY OF THYROID DYSFUNCTION: ICD-10-CM

## 2022-11-22 LAB — HBA1C MFR BLD: 11 %

## 2022-11-22 PROCEDURE — 3046F HEMOGLOBIN A1C LEVEL >9.0%: CPT | Performed by: NURSE PRACTITIONER

## 2022-11-22 PROCEDURE — 83036 HEMOGLOBIN GLYCOSYLATED A1C: CPT | Performed by: NURSE PRACTITIONER

## 2022-11-22 PROCEDURE — 99214 OFFICE O/P EST MOD 30 MIN: CPT | Performed by: NURSE PRACTITIONER

## 2022-11-22 RX ORDER — DULAGLUTIDE 0.75 MG/.5ML
INJECTION, SOLUTION SUBCUTANEOUS
Qty: 4 ADJUSTABLE DOSE PRE-FILLED PEN SYRINGE | Refills: 0 | Status: SHIPPED | OUTPATIENT
Start: 2022-11-22

## 2022-11-22 NOTE — PROGRESS NOTES
[address] [facility] Department of Endocrinology Diabetes and Metabolism   [address] [facility] [address]   Phone: [phone]  Fax: [phone]      Date of Service: 11/22/2022  Primary Care Physician: Kerry Nicholas MD.  Provider: RENETTA Oscar NP         Reason for the visit:  DM type 2     History of Present Illness: The history is provided by the patient. No  was used. Accuracy of the patient data is excellent. Nishant Snyder is a very pleasant 60 y.o. female seen today for follow up visit     Nishant Snyder was diagnosed with diabetes at age 47  and currently on Januvia 100 mg daily, Metformin 500 mg in am and 1000 mg dinner, Glimepiride 4 mg BID, Basaglar 13 units daily ( was having lows when taking 16 units  - 78 was the lowest)    Not tolerating Metformin in the AM - has been not taking it     She is checking her BS daily   AVG     Pt admit poor compliance with diet recently   Lab Results   Component Value Date/Time    LABA1C 11.0 11/22/2022 08:21 AM    LABA1C 12.3 08/24/2022 08:10 AM    LABA1C 9.1 10/07/2021 07:40 AM     Patient has had no hypoglycemic episodes   She has been mindful of her diet   She eats 3 meals a day and snacks all day   I reviewed current medications and the patient has no issues with diabetes medications  The patient is up to date with eye exam and denied any h/o diabetic retinopathy. She is seeing podiatrist every 3 months and also performs her own foot care.  Last seen was last month   Microvascular complications:  No Retinopathy, Nephropathy or Neuropathy   Macrovascular complications: no CAD, PVD, or Stroke    No HX of pancreatitis  No Hx of MTC  No HX of gastroparesis   No HX of UTI/Mycotic infection       PAST MEDICAL HISTORY   Past Medical History:   Diagnosis Date    Seizures ([facility] [address]     Type II or unspecified type diabetes mellitus without mention of complication, not stated as uncontrolled     Unspecified sleep apnea     stopped after weight loss     PAST SURGICAL HISTORY   Past Surgical History:   Procedure Laterality Date    HYSTERECTOMY (CERVIX STATUS UNKNOWN)       SOCIAL HISTORY   Tobacco:   reports that she has never smoked. She has never used smokeless tobacco.  Alcol:   reports current alcohol use. Illicit Drugs:   reports no history of drug use. FAMILY HISTORY   Family History   Problem Relation Age of Onset    Thyroid Disease Mother     High Blood Pressure Mother     Arthritis Mother     High Blood Pressure Father     High Blood Pressure Sister     Diabetes Maternal Uncle     Diabetes Paternal Aunt     Diabetes Maternal Grandmother     Cancer Maternal Grandfather     Thyroid Disease Sister      ALLERGIES AND DRUG REACTIONS   Allergies   Allergen Reactions    Sulfa Antibiotics Itching    Trulicity [Dulaglutide] Diarrhea and Nausea And Vomiting       CURRENT MEDICATIONS     Current Outpatient Medications   Medication Sig Dispense Refill    Dulaglutide (TRULICITY) 6.35 /0.5PO SOPN Inject 0.75 mg weekly 4 Adjustable Dose Pre-filled Pen Syringe 0    glimepiride (AMARYL) 4 MG tablet Take 1 tablet twice a day 180 tablet 1    SITagliptin (JANUVIA) 100 MG tablet TAKE 1 TABLET DAILY 90 tablet 1    insulin glargine (BASAGLAR KWIKPEN) 100 UNIT/ML injection pen Inject 16 units at night (Patient taking differently: Inject 13 units at night) 2 Adjustable Dose Pre-filled Pen Syringe 6    Insulin Pen Needle (BD PEN NEEDLE JEFFERY 2ND GEN) 32G X 4 MM MISC To use with insulin pens 200 each 5    vitamin D (ERGOCALCIFEROL) 1.25 MG (67682 UT) CAPS capsule TAKE 1 CAPSULE ONCE A WEEK ON FRIDAY 12 capsule 3    metFORMIN (GLUCOPHAGE) 1000 MG tablet TAKE 1 TABLET TWICE A DAY WITH MEALS (Patient taking differently: 1 tablet at night and 1/2 tablet in the morning) 180 tablet 3    blood glucose monitor strips One-Touch Ultra strips.  Test 2  times a day 200 strip 5    levETIRAcetam (KEPPRA) 750 MG tablet Take 1 tablet by mouth 2 times daily (Patient taking differently: Take 1,000 mg by mouth nightly) 180 tablet 2     No current facility-administered medications for this visit. Review of Systems  Constitutional: No fever, no chills, no diaphoresis, no generalized weakness. HEENT: No blurred vision, No sore throat, no ear pain, no hair loss  Neck: denied any neck swelling, difficulty swallowing,   Cardio-pulmonary: No CP, SOB or palpitation, No orthopnea or PND. No cough or wheezing. GI: No N/V/D, no constipation, No abdominal pain, no melena or hematochezia   : Denied any dysuria, hematuria, flank pain, discharge, or incontinence. Skin: denied any rash, ulcer, Hirsute, or hyperpigmentation. MSK: denied any joint deformity, joint pain/swelling, muscle pain, or back pain. Neuro: no numbness, no tingling, no weakness    OBJECTIVE    /63   Pulse 77   Ht 5' 9\" (1.753 m)   Wt 155 lb (70.3 kg)   BMI 22.89 kg/m²   BP Readings from Last 4 Encounters:   11/22/22 116/63   10/18/22 136/70   08/24/22 138/67   05/03/21 126/68     Wt Readings from Last 6 Encounters:   11/22/22 155 lb (70.3 kg)   10/18/22 161 lb (73 kg)   08/24/22 154 lb (69.9 kg)   05/26/21 154 lb (69.9 kg)   05/03/21 164 lb (74.4 kg)   02/22/21 65 lb (29.5 kg)     Physical examination:  General: awake alert, oriented x3, no abnormal position or movements. HEENT: normocephalic non-traumatic, no exophthalmos   Neck: supple, no LN enlargement, no thyromegaly, no thyroid tenderness, no JVD. Pulm: Clear equal air entry no added sounds, no wheezing or rhonchi    CVS: S1 + S2, no murmur, no heave. Dorsalis pedis pulse palpable   Abd: soft lax, no tenderness, no organomegaly, audible bowel sounds.    Skin: warm, no lesions, no rash. + callus, no Ulcers, No acanthosis nigricans   Neuro: CN intact,  sensation decreased bilateral , muscle power normal  Psych: normal mood, and affect    Review of Laboratory Data:  I have reviewed the following:  Lab Results   Component Value Date/Time    WBC 3.1 (L) 12/01/2021 07:20 AM    RBC 3.53 12/01/2021 07:20 AM    HGB 9.8 (L) 12/01/2021 07:20 AM    HCT 32.5 (L) 12/01/2021 07:20 AM    MCV 92.1 12/01/2021 07:20 AM    MCH 27.8 12/01/2021 07:20 AM    MCHC 30.2 (L) 12/01/2021 07:20 AM    RDW 15.1 (H) 12/01/2021 07:20 AM     12/01/2021 07:20 AM    MPV 9.8 12/01/2021 07:20 AM      Lab Results   Component Value Date/Time     10/07/2021 07:40 AM    K 3.9 10/07/2021 07:40 AM    CO2 26 10/07/2021 07:40 AM    BUN 9 10/07/2021 07:40 AM    CALCIUM 9.2 10/07/2021 07:40 AM      Lab Results   Component Value Date/Time    LABA1C 11.0 11/22/2022 08:21 AM    GLUCOSE 106 10/07/2021 07:40 AM    MALBCR 68.3 10/07/2021 07:41 AM    LABMICR 103.2 10/07/2021 07:41 AM    LABCREA 151 10/07/2021 07:41 AM     Lab Results   Component Value Date/Time    CHOL 175 08/21/2019 07:41 AM    CHOL 156 04/27/2019 10:19 AM    CHOL 181 07/07/2018 10:51 AM    CHOL 142 01/23/2017 11:05 AM    TRIG 72 08/21/2019 07:41 AM    TRIG 46 04/27/2019 10:19 AM    TRIG 70 07/07/2018 10:51 AM    TRIG 60 01/23/2017 11:05 AM    HDL 57 08/21/2019 07:41 AM    HDL 63 04/27/2019 10:19 AM    HDL 55 07/07/2018 10:51 AM    HDL 48 01/23/2017 11:05 AM     Lab Results   Component Value Date/Time    VITD25 41 10/07/2021 07:40 AM    VITD25 45 09/16/2020 07:43 AM    VITD25 42 07/07/2018 10:51 AM    VITD25 36 01/23/2017 11:05 AM     ASSESSMENT & RECOMMENDATIONS   Yulisa Clark, a 60 y.o.-old female seen in for the following issues     Diabetes Mellitus Type 2    Uncontrolled due to poor compliance with diet but A1c improving   Pt still hesistant to start short acting insulin with meals    Take Basaglar 13 units daily at bedtime, Glimepiride 4 mg BID, restart Metformin to 500 am and 1000 mg in the PM  Stop  Januvia 100 mg daily  Will start Trulicity 8.70 mg weekly- no contraindications and side effects reviewed  Sent 0.75 mg x 1 month to [facility], if tolerating will increase to 1.5mg weekly  to [facility]   Goal to stop metformin and start jardiance next OV due to GI side affects   Advised to follow diabetes diet and send us sugar in 1 wk -2 checking BS BID  DM labs have imelda ordered by PCP  Discussed with patient A1c and blood sugar goals     Vit D deficiency   Continue vitD supplements   Check vitD level before next visit      Dietary noncompliance  Discussed with patient the importance of eating consistent carbohydrate meals, avoiding high glycemic index food. Also, discussed with patient the risk and negative consequences of dietary noncompliance on blood glucose control, blood pressure and weight    Family hx of thryroid  Pt requesting screen   TFT ordered      I personally reviewed external notes from PCP and other patient's care team providers, and personally interpreted labs associated with the above diagnosis. I also ordered labs to further assess and manage the above addressed medical conditions    Return in about 2 months (around 1/22/2023) for Type 2 DM. The above issues were reviewed with the patient who understood and agreed with the plan. Thank you for allowing us to participate in the care of this patient. Please do not hesitate to contact us with any additional questions. Diagnosis Orders   1. Type 2 diabetes mellitus with hyperglycemia, with long-term current use of insulin (HCC)  POCT glycosylated hemoglobin (Hb A1C)    Dulaglutide (TRULICITY) 3.47 WL/5.4QN SOPN      2. Vitamin D deficiency        3. Dietary noncompliance        4. Family history of thyroid dysfunction  TSH    T4, Free        RENETTA Rausch NP    [facility])   [facility] [address] [facility] [address]   Phone: [phone]  Fax: [phone]  ------------------------  An electronic signature was used to authenticate this note.  RENETTA Rausch NP on 11/22/2022 at 8:47 AM

## 2022-12-05 ENCOUNTER — HOSPITAL ENCOUNTER (OUTPATIENT)
Age: 62
Discharge: HOME | End: 2022-12-05
Payer: COMMERCIAL

## 2022-12-05 DIAGNOSIS — Z83.49 FAMILY HISTORY OF THYROID DYSFUNCTION: ICD-10-CM

## 2022-12-05 LAB
ALBUMIN SERPL-MCNC: 4.7 G/DL (ref 3.5–5.2)
ALP BLD-CCNC: 61 U/L (ref 35–104)
ALT SERPL-CCNC: 12 U/L (ref 0–32)
ANION GAP SERPL CALCULATED.3IONS-SCNC: 16 MMOL/L (ref 7–16)
AST SERPL-CCNC: 13 U/L (ref 0–31)
BASOPHILS ABSOLUTE: 0.02 E9/L (ref 0–0.2)
BASOPHILS RELATIVE PERCENT: 0.4 % (ref 0–2)
BILIRUB SERPL-MCNC: 0.8 MG/DL (ref 0–1.2)
BUN BLDV-MCNC: 19 MG/DL (ref 6–23)
CALCIUM SERPL-MCNC: 10.2 MG/DL (ref 8.6–10.2)
CHLORIDE BLD-SCNC: 100 MMOL/L (ref 98–107)
CO2: 24 MMOL/L (ref 22–29)
CREAT SERPL-MCNC: 0.8 MG/DL (ref 0.5–1)
CREATININE URINE: 200 MG/DL (ref 29–226)
EOSINOPHILS ABSOLUTE: 0 E9/L (ref 0.05–0.5)
EOSINOPHILS RELATIVE PERCENT: 0 % (ref 0–6)
GFR SERPL CREATININE-BSD FRML MDRD: >60 ML/MIN/1.73
GLUCOSE BLD-MCNC: 179 MG/DL (ref 74–99)
HBA1C MFR BLD: 11.8 % (ref 4–5.6)
HCT VFR BLD CALC: 34.3 % (ref 34–48)
HEMOGLOBIN: 10.4 G/DL (ref 11.5–15.5)
IMMATURE GRANULOCYTES #: 0.03 E9/L
IMMATURE GRANULOCYTES %: 0.6 % (ref 0–5)
LYMPHOCYTES ABSOLUTE: 1.46 E9/L (ref 1.5–4)
LYMPHOCYTES RELATIVE PERCENT: 30.4 % (ref 20–42)
MCH RBC QN AUTO: 26.5 PG (ref 26–35)
MCHC RBC AUTO-ENTMCNC: 30.3 % (ref 32–34.5)
MCV RBC AUTO: 87.5 FL (ref 80–99.9)
MICROALBUMIN UR-MCNC: 38.6 MG/L
MICROALBUMIN/CREAT UR-RTO: 19.3 (ref 0–30)
MONOCYTES ABSOLUTE: 0.27 E9/L (ref 0.1–0.95)
MONOCYTES RELATIVE PERCENT: 5.6 % (ref 2–12)
NEUTROPHILS ABSOLUTE: 3.03 E9/L (ref 1.8–7.3)
NEUTROPHILS RELATIVE PERCENT: 63 % (ref 43–80)
PDW BLD-RTO: 16 FL (ref 11.5–15)
PLATELET # BLD: 415 E9/L (ref 130–450)
PMV BLD AUTO: 9.8 FL (ref 7–12)
POTASSIUM SERPL-SCNC: 3.6 MMOL/L (ref 3.5–5)
RBC # BLD: 3.92 E12/L (ref 3.5–5.5)
SODIUM BLD-SCNC: 140 MMOL/L (ref 132–146)
T4 FREE: 1.47 NG/DL (ref 0.93–1.7)
TOTAL PROTEIN: 7.9 G/DL (ref 6.4–8.3)
TSH SERPL DL<=0.05 MIU/L-ACNC: 1.24 UIU/ML (ref 0.27–4.2)
WBC # BLD: 4.8 E9/L (ref 4.5–11.5)

## 2022-12-05 PROCEDURE — 80053 COMPREHEN METABOLIC PANEL: CPT

## 2022-12-05 PROCEDURE — 83036 HEMOGLOBIN GLYCOSYLATED A1C: CPT

## 2022-12-05 PROCEDURE — 36415 COLL VENOUS BLD VENIPUNCTURE: CPT

## 2022-12-05 PROCEDURE — 82044 UR ALBUMIN SEMIQUANTITATIVE: CPT

## 2022-12-05 PROCEDURE — 85025 COMPLETE CBC W/AUTO DIFF WBC: CPT

## 2022-12-05 PROCEDURE — 84439 ASSAY OF FREE THYROXINE: CPT

## 2022-12-05 PROCEDURE — 84443 ASSAY THYROID STIM HORMONE: CPT

## 2022-12-05 PROCEDURE — 82570 ASSAY OF URINE CREATININE: CPT

## 2022-12-05 PROCEDURE — 82652 VIT D 1 25-DIHYDROXY: CPT

## 2022-12-08 LAB — VITAMIN D 1,25-DIHYDROXY: 85 PG/ML (ref 19.9–79.3)

## 2022-12-15 DIAGNOSIS — Z79.4 TYPE 2 DIABETES MELLITUS WITH HYPERGLYCEMIA, WITH LONG-TERM CURRENT USE OF INSULIN (HCC): Primary | ICD-10-CM

## 2022-12-15 DIAGNOSIS — E11.65 TYPE 2 DIABETES MELLITUS WITH HYPERGLYCEMIA, WITH LONG-TERM CURRENT USE OF INSULIN (HCC): Primary | ICD-10-CM

## 2022-12-15 RX ORDER — DULAGLUTIDE 1.5 MG/.5ML
1.5 INJECTION, SOLUTION SUBCUTANEOUS WEEKLY
Qty: 6 ML | Refills: 3 | Status: SHIPPED | OUTPATIENT
Start: 2022-12-15

## 2022-12-15 NOTE — PROGRESS NOTES
Severiano Africa called in requesting when she was suppose to increase to 6.8HB of Trulicity? I looked into the last encounter with David Toro. Once she was okay on the 0.75mg we would send into [facility]. She stated she was doing fine. So I told her I would send it in today.

## 2022-12-23 DIAGNOSIS — E11.8 TYPE 2 DIABETES MELLITUS WITH COMPLICATION (HCC): ICD-10-CM

## 2022-12-23 RX ORDER — INSULIN GLARGINE 100 [IU]/ML
INJECTION, SOLUTION SUBCUTANEOUS
Qty: 45 ML | Refills: 2 | Status: SHIPPED | OUTPATIENT
Start: 2022-12-23

## 2023-01-24 ENCOUNTER — OFFICE VISIT (OUTPATIENT)
Dept: ENDOCRINOLOGY | Age: 63
End: 2023-01-24
Payer: COMMERCIAL

## 2023-01-24 VITALS
HEIGHT: 69 IN | HEART RATE: 64 BPM | BODY MASS INDEX: 21.92 KG/M2 | SYSTOLIC BLOOD PRESSURE: 127 MMHG | WEIGHT: 148 LBS | DIASTOLIC BLOOD PRESSURE: 78 MMHG

## 2023-01-24 DIAGNOSIS — E55.9 VITAMIN D DEFICIENCY: ICD-10-CM

## 2023-01-24 DIAGNOSIS — E11.8 TYPE 2 DIABETES MELLITUS WITH COMPLICATION (HCC): ICD-10-CM

## 2023-01-24 DIAGNOSIS — Z91.119 DIETARY NONCOMPLIANCE: ICD-10-CM

## 2023-01-24 DIAGNOSIS — Z83.49 FAMILY HISTORY OF THYROID DYSFUNCTION: ICD-10-CM

## 2023-01-24 DIAGNOSIS — E11.8 TYPE 2 DIABETES MELLITUS WITH COMPLICATION (HCC): Primary | ICD-10-CM

## 2023-01-24 LAB
ANION GAP SERPL CALCULATED.3IONS-SCNC: 11 MMOL/L (ref 7–16)
BUN BLDV-MCNC: 9 MG/DL (ref 6–23)
CALCIUM SERPL-MCNC: 9.6 MG/DL (ref 8.6–10.2)
CHLORIDE BLD-SCNC: 99 MMOL/L (ref 98–107)
CO2: 28 MMOL/L (ref 22–29)
CREAT SERPL-MCNC: 0.7 MG/DL (ref 0.5–1)
GFR SERPL CREATININE-BSD FRML MDRD: >60 ML/MIN/1.73
GLUCOSE BLD-MCNC: 105 MG/DL (ref 74–99)
POTASSIUM SERPL-SCNC: 4.7 MMOL/L (ref 3.5–5)
SODIUM BLD-SCNC: 138 MMOL/L (ref 132–146)

## 2023-01-24 PROCEDURE — 99214 OFFICE O/P EST MOD 30 MIN: CPT | Performed by: NURSE PRACTITIONER

## 2023-01-24 NOTE — PROGRESS NOTES
[address] [place] [address] Department of Endocrinology Diabetes and Metabolism   [address] [facility] [address]   Phone: [phone]  Fax: [phone]      Date of Service: 1/24/2023  Primary Care Physician: Lc Marshall MD.  Provider: RENETTA Steven NP         Reason for the visit:  DM type 2     History of Present Illness: The history is provided by the patient. No  was used. Accuracy of the patient data is excellent. Gabi Whitten is a very pleasant 60 y.o. female seen today for follow up visit     Gabi Whitten was diagnosed with diabetes at age 47  and currently on: Metformin 500 mg in am and 1000 mg dinner, Glimepiride 4 mg BID, Basaglar 13 units daily, Trulicity 1.5  mg weekly    She did tolerate jardiance due to abdominal pain     She is checking her BS daily  AVG BS   A1c does not reflect recall      Lab Results   Component Value Date/Time    LABA1C 11.8 12/05/2022 10:25 AM    LABA1C 11.0 11/22/2022 08:21 AM    LABA1C 12.3 08/24/2022 08:10 AM     Patient has had no hypoglycemic episodes   She has been mindful of her diet   She eats 3 meals a day and snacks all day   I reviewed current medications and the patient has no issues with diabetes medications  The patient is up to date with eye exam and denied any h/o diabetic retinopathy. She is seeing podiatrist every 3 months and also performs her own foot care.  Last seen was last month   Microvascular complications:  No Retinopathy, Nephropathy or Neuropathy   Macrovascular complications: no CAD, PVD, or Stroke    No HX of pancreatitis  No Hx of MTC  No HX of gastroparesis   No HX of UTI/Mycotic infection       PAST MEDICAL HISTORY   Past Medical History:   Diagnosis Date    Seizures ([facility] [address]     Type II or unspecified type diabetes mellitus without mention of complication, not stated as uncontrolled     Unspecified sleep apnea     stopped after weight loss     PAST SURGICAL HISTORY   Past Surgical History:   Procedure Laterality Date    HYSTERECTOMY (CERVIX STATUS UNKNOWN)       SOCIAL HISTORY   Tobacco:   reports that she has never smoked. She has never used smokeless tobacco.  Alcol:   reports current alcohol use. Illicit Drugs:   reports no history of drug use. FAMILY HISTORY   Family History   Problem Relation Age of Onset    Thyroid Disease Mother     High Blood Pressure Mother     Arthritis Mother     High Blood Pressure Father     High Blood Pressure Sister     Diabetes Maternal Uncle     Diabetes Paternal Aunt     Diabetes Maternal Grandmother     Cancer Maternal Grandfather     Thyroid Disease Sister      ALLERGIES AND DRUG REACTIONS   Allergies   Allergen Reactions    Sulfa Antibiotics Itching    Trulicity [Dulaglutide] Diarrhea and Nausea And Vomiting       CURRENT MEDICATIONS     Current Outpatient Medications   Medication Sig Dispense Refill    insulin glargine (BASAGLAR KWIKPEN) 100 UNIT/ML injection pen Inject 13 units at night 45 mL 2    metFORMIN (GLUCOPHAGE) 1000 MG tablet 1 tablet at night and 1/2 tablet in the morning 180 tablet 3    dulaglutide (TRULICITY) 1.5 EV/4.2QV SC injection Inject 0.5 mLs into the skin once a week 6 mL 3    glimepiride (AMARYL) 4 MG tablet Take 1 tablet twice a day 180 tablet 1    Insulin Pen Needle (BD PEN NEEDLE JEFFERY 2ND GEN) 32G X 4 MM MISC To use with insulin pens 200 each 5    blood glucose monitor strips One-Touch Ultra strips. Test 2  times a day 200 strip 5    levETIRAcetam (KEPPRA) 750 MG tablet Take 1 tablet by mouth 2 times daily (Patient taking differently: Take 1,000 mg by mouth nightly) 180 tablet 2    vitamin D (ERGOCALCIFEROL) 1.25 MG (23638 UT) CAPS capsule TAKE 1 CAPSULE ONCE A WEEK ON FRIDAY (Patient not taking: Reported on 1/24/2023) 12 capsule 3     No current facility-administered medications for this visit. Review of Systems  Constitutional: No fever, no chills, no diaphoresis, no generalized weakness.   HEENT: No blurred vision, No sore throat, no ear pain, no hair loss  Neck: denied any neck swelling, difficulty swallowing,   Cardio-pulmonary: No CP, SOB or palpitation, No orthopnea or PND. No cough or wheezing. GI: No N/V/D, no constipation, No abdominal pain, no melena or hematochezia   : Denied any dysuria, hematuria, flank pain, discharge, or incontinence. Skin: denied any rash, ulcer, Hirsute, or hyperpigmentation. MSK: denied any joint deformity, joint pain/swelling, muscle pain, or back pain. Neuro: no numbness, no tingling, no weakness    OBJECTIVE    /78   Pulse 64   Ht 5' 9\" (1.753 m)   Wt 148 lb (67.1 kg)   BMI 21.86 kg/m²   BP Readings from Last 4 Encounters:   01/24/23 127/78   11/22/22 116/63   10/18/22 136/70   08/24/22 138/67     Wt Readings from Last 6 Encounters:   01/24/23 148 lb (67.1 kg)   11/22/22 155 lb (70.3 kg)   10/18/22 161 lb (73 kg)   08/24/22 154 lb (69.9 kg)   05/26/21 154 lb (69.9 kg)   05/03/21 164 lb (74.4 kg)     Physical examination:  General: awake alert, oriented x3, no abnormal position or movements. HEENT: normocephalic non-traumatic, no exophthalmos   Neck: supple, no LN enlargement, no thyromegaly, no thyroid tenderness, no JVD. Pulm: Clear equal air entry no added sounds, no wheezing or rhonchi    CVS: S1 + S2, no murmur, no heave. Dorsalis pedis pulse palpable   Abd: soft lax, no tenderness, no organomegaly, audible bowel sounds.    Skin: warm, no lesions, no rash. + callus, no Ulcers, No acanthosis nigricans   Neuro: CN intact,  sensation decreased bilateral , muscle power normal  Psych: normal mood, and affect    Review of Laboratory Data:  I have reviewed the following:  Lab Results   Component Value Date/Time    WBC 4.8 12/05/2022 10:25 AM    RBC 3.92 12/05/2022 10:25 AM    HGB 10.4 (L) 12/05/2022 10:25 AM    HCT 34.3 12/05/2022 10:25 AM    MCV 87.5 12/05/2022 10:25 AM    MCH 26.5 12/05/2022 10:25 AM    MCHC 30.3 (L) 12/05/2022 10:25 AM    RDW 16.0 (H) 12/05/2022 10:25 AM     12/05/2022 10:25 AM    MPV 9.8 12/05/2022 10:25 AM      Lab Results   Component Value Date/Time     12/05/2022 10:25 AM    K 3.6 12/05/2022 10:25 AM    CO2 24 12/05/2022 10:25 AM    BUN 19 12/05/2022 10:25 AM    CALCIUM 10.2 12/05/2022 10:25 AM      Lab Results   Component Value Date/Time    LABA1C 11.8 12/05/2022 10:25 AM    GLUCOSE 179 12/05/2022 10:25 AM    MALBCR 19.3 12/05/2022 10:29 AM    LABMICR 38.6 12/05/2022 10:29 AM    LABCREA 200 12/05/2022 10:29 AM     Lab Results   Component Value Date/Time    CHOL 175 08/21/2019 07:41 AM    CHOL 156 04/27/2019 10:19 AM    CHOL 181 07/07/2018 10:51 AM    CHOL 142 01/23/2017 11:05 AM    TRIG 72 08/21/2019 07:41 AM    TRIG 46 04/27/2019 10:19 AM    TRIG 70 07/07/2018 10:51 AM    TRIG 60 01/23/2017 11:05 AM    HDL 57 08/21/2019 07:41 AM    HDL 63 04/27/2019 10:19 AM    HDL 55 07/07/2018 10:51 AM    HDL 48 01/23/2017 11:05 AM     Lab Results   Component Value Date/Time    VITD25 41 10/07/2021 07:40 AM    VITD25 45 09/16/2020 07:43 AM    VITD25 42 07/07/2018 10:51 AM    VITD25 36 01/23/2017 11:05 AM     ASSESSMENT & RECOMMENDATIONS   Carmen Frausto, a 60 y.o.-old female seen in for the following issues     Diabetes Mellitus Type 2    Uncontrolled due to poor compliance with diet A1c 11.8%  Pt only checking BS in AM  Not enough data to adjust   Pt to check her BS 4x a day and call readings in in Thursday, will likely need to start short acting, pt agreeable   Take Basaglar 13 units daily at bedtime, Glimepiride 4 mg BID, Metformin  500 am and 1000 mg in the PM   Will check JASON antibody and C-peptide  Discussed with patient A1c and blood sugar goals     Vit D deficiency   Last level 85 on  12/5/22  Replacement put on hold by PCP  Will recheck levels in 3 months, likely will need to restart at lower dose daily as opposed to weekly dose previously on     Dietary noncompliance  Discussed with patient the importance of eating consistent carbohydrate meals, avoiding high glycemic index food. Also, discussed with patient the risk and negative consequences of dietary noncompliance on blood glucose control, blood pressure and weight    Family hx of thryroid  Pt requesting screen   TSH at goal on 12/5/22      I personally reviewed external notes from PCP and other patient's care team providers, and personally interpreted labs associated with the above diagnosis. I also ordered labs to further assess and manage the above addressed medical conditions    Return in about 2 months (around 3/24/2023) for Type 2 DM . The above issues were reviewed with the patient who understood and agreed with the plan. Thank you for allowing us to participate in the care of this patient. Please do not hesitate to contact us with any additional questions. Diagnosis Orders   1. Type 2 diabetes mellitus with complication (HCC)  C-Peptide    GLUTAMIC ACID DECARBOXYLASE    Basic Metabolic Panel      2. Vitamin D deficiency        3. Dietary noncompliance        4. Family history of thyroid dysfunction            RENETTA Lowery NP    [facility])   [address] [place] [address], [address] [place] [address],[address] [address]   Phone: [phone]  Fax: [phone]  ------------------------  An electronic signature was used to authenticate this note.  RENETTA Lowery NP on 1/24/2023 at 3:17 PM

## 2023-01-27 LAB — C-PEPTIDE: 0.9 NG/ML (ref 0.5–3.3)

## 2023-01-28 LAB — GLUTAMIC ACID DECARB AB: >250 IU/ML (ref 0–5)

## 2023-01-30 ENCOUNTER — TELEPHONE (OUTPATIENT)
Dept: ENDOCRINOLOGY | Age: 63
End: 2023-01-30

## 2023-01-30 NOTE — TELEPHONE ENCOUNTER
"   01/19/19 1600   Psycho Education   Type of Intervention structured groups   Response participates with encouragement   Hours 1   Treatment Detail dual group     Pt attended dual group and required encouragement to actively participate. Pt refused to do his intro, stating, \"I am not comfortable doing that at all.\" He reported that he had his drug chart and safety plan completed but did not want to present today. Agreed he would try to present both tomorrow during group. He was actively engaged in group discussion.   " Please call pt and inform her that her JASON antibody is  > 250, Anything > 5 is type 1, We will treat her as type 1  not Type 2. She needs to stop glimiperide and metformin. We will use insulin only for her DM management. I also need her BS readings. She can qualify now for CGM to avoid checking her BS via fingerstick

## 2023-02-07 ENCOUNTER — TELEPHONE (OUTPATIENT)
Dept: ENDOCRINOLOGY | Age: 63
End: 2023-02-07

## 2023-02-07 NOTE — TELEPHONE ENCOUNTER
Patient called wanted to talk to [facility], she stated she called you last week and she didn't get a return ramon, I try to call to see wait she need but I could only leave a message.       Can you please call her

## 2023-02-08 DIAGNOSIS — E11.8 TYPE 2 DIABETES MELLITUS WITH COMPLICATION (HCC): Primary | ICD-10-CM

## 2023-02-08 RX ORDER — INSULIN LISPRO 100 [IU]/ML
INJECTION, SOLUTION INTRAVENOUS; SUBCUTANEOUS
Qty: 15 ML | Refills: 6 | Status: SHIPPED | OUTPATIENT
Start: 2023-02-08

## 2023-02-22 DIAGNOSIS — E11.8 TYPE 2 DIABETES MELLITUS WITH COMPLICATION (HCC): Primary | ICD-10-CM

## 2023-02-22 RX ORDER — BLOOD-GLUCOSE SENSOR
EACH MISCELLANEOUS
Qty: 2 EACH | Refills: 6 | Status: SHIPPED | OUTPATIENT
Start: 2023-02-22

## 2023-02-23 ENCOUNTER — TELEPHONE (OUTPATIENT)
Dept: ENDOCRINOLOGY | Age: 63
End: 2023-02-23

## 2023-02-23 NOTE — TELEPHONE ENCOUNTER
Pt called and left a message asking someone to call her about her quirino refill. I attempted to call her back to let her know they were already sent in but the line rang busy.

## 2023-03-21 ENCOUNTER — TELEPHONE (OUTPATIENT)
Dept: ENDOCRINOLOGY | Age: 63
End: 2023-03-21

## 2023-03-21 NOTE — TELEPHONE ENCOUNTER
Called and unable to leave message. Pt requesting call from [facility] directly.  Did attempt to call to get more info on medication issues

## 2023-03-22 ENCOUNTER — TELEPHONE (OUTPATIENT)
Dept: ENDOCRINOLOGY | Age: 63
End: 2023-03-22
Payer: COMMERCIAL

## 2023-03-22 DIAGNOSIS — E11.8 TYPE 2 DIABETES MELLITUS WITH COMPLICATION (HCC): Primary | ICD-10-CM

## 2023-03-22 PROCEDURE — 95251 CONT GLUC MNTR ANALYSIS I&R: CPT | Performed by: NURSE PRACTITIONER

## 2023-03-22 NOTE — TELEPHONE ENCOUNTER
Pt to increase Humalog to 4 units + SS at meals and Long acting 14 at bedtime. She is to call in next Thursday for Monetz review.  I spoke with pt and updated her on changes

## 2023-03-30 ENCOUNTER — TELEPHONE (OUTPATIENT)
Dept: ENDOCRINOLOGY | Age: 63
End: 2023-03-30

## 2023-04-17 ENCOUNTER — TELEPHONE (OUTPATIENT)
Dept: ENDOCRINOLOGY | Age: 63
End: 2023-04-17

## 2023-04-17 NOTE — TELEPHONE ENCOUNTER
Pt called and stated sugar levels are everywhere. She would like to talk to [facility].  Attached christopher

## 2023-04-18 NOTE — TELEPHONE ENCOUNTER
Change Lantus to 18 units and change Humalog to 8 U + SS before meals.  Would pt want to try a pump so we can eliminate injections and have the pump adjust her insulin automaticaly every 5 min

## 2023-04-26 DIAGNOSIS — E11.8 TYPE 2 DIABETES MELLITUS WITH COMPLICATION (HCC): ICD-10-CM

## 2023-04-26 RX ORDER — PEN NEEDLE, DIABETIC 32GX 5/32"
NEEDLE, DISPOSABLE MISCELLANEOUS
Qty: 400 EACH | Refills: 3 | Status: SHIPPED | OUTPATIENT
Start: 2023-04-26

## 2023-05-18 DIAGNOSIS — E11.8 TYPE 2 DIABETES MELLITUS WITH COMPLICATION (HCC): ICD-10-CM

## 2023-05-18 RX ORDER — INSULIN LISPRO 100 [IU]/ML
INJECTION, SOLUTION INTRAVENOUS; SUBCUTANEOUS
Qty: 15 ML | Refills: 5 | Status: SHIPPED | OUTPATIENT
Start: 2023-05-18

## 2023-05-25 ENCOUNTER — OFFICE VISIT (OUTPATIENT)
Dept: ENDOCRINOLOGY | Age: 63
End: 2023-05-25
Payer: COMMERCIAL

## 2023-05-25 VITALS
DIASTOLIC BLOOD PRESSURE: 75 MMHG | HEART RATE: 78 BPM | WEIGHT: 164 LBS | BODY MASS INDEX: 24.22 KG/M2 | SYSTOLIC BLOOD PRESSURE: 152 MMHG

## 2023-05-25 DIAGNOSIS — Z91.119 DIETARY NONCOMPLIANCE: ICD-10-CM

## 2023-05-25 DIAGNOSIS — Z83.49 FAMILY HISTORY OF THYROID DYSFUNCTION: ICD-10-CM

## 2023-05-25 DIAGNOSIS — E55.9 VITAMIN D DEFICIENCY: ICD-10-CM

## 2023-05-25 DIAGNOSIS — E11.8 TYPE 2 DIABETES MELLITUS WITH COMPLICATION (HCC): Primary | ICD-10-CM

## 2023-05-25 LAB — HBA1C MFR BLD: 9.5 %

## 2023-05-25 PROCEDURE — 99214 OFFICE O/P EST MOD 30 MIN: CPT | Performed by: NURSE PRACTITIONER

## 2023-05-25 PROCEDURE — 3046F HEMOGLOBIN A1C LEVEL >9.0%: CPT | Performed by: NURSE PRACTITIONER

## 2023-05-25 PROCEDURE — 83036 HEMOGLOBIN GLYCOSYLATED A1C: CPT | Performed by: NURSE PRACTITIONER

## 2023-05-25 RX ORDER — PROCHLORPERAZINE 25 MG/1
SUPPOSITORY RECTAL
Qty: 3 EACH | Refills: 3 | Status: SHIPPED | OUTPATIENT
Start: 2023-05-25

## 2023-05-25 RX ORDER — PROCHLORPERAZINE 25 MG/1
SUPPOSITORY RECTAL
Qty: 1 EACH | Refills: 0 | Status: SHIPPED | OUTPATIENT
Start: 2023-05-25

## 2023-05-25 RX ORDER — INSULIN PMP CART,AUT,G6/7,CNTR
EACH SUBCUTANEOUS
Qty: 30 EACH | Refills: 3 | Status: SHIPPED | OUTPATIENT
Start: 2023-05-25

## 2023-05-25 RX ORDER — PROCHLORPERAZINE 25 MG/1
SUPPOSITORY RECTAL
Qty: 1 EACH | Refills: 3 | Status: SHIPPED | OUTPATIENT
Start: 2023-05-25

## 2023-05-25 RX ORDER — PROCHLORPERAZINE 25 MG/1
SUPPOSITORY RECTAL
Qty: 3 EACH | Refills: 3 | Status: SHIPPED
Start: 2023-05-25 | End: 2023-05-25

## 2023-05-25 RX ORDER — INSULIN PMP CART,AUT,G6/7,CNTR
EACH SUBCUTANEOUS
Qty: 1 KIT | Refills: 0 | Status: SHIPPED | OUTPATIENT
Start: 2023-05-25

## 2023-05-25 NOTE — PROGRESS NOTES
[address] [place] [address] Department of Endocrinology Diabetes and Metabolism   [address] [facility] [address]   Phone: [phone]  Fax: [phone]      Date of Service: 5/25/2023  Primary Care Physician: Gamal Perdomo MD.  Provider: RENETTA Rodriguez NP         Reason for the visit:  DM type 1      History of Present Illness: The history is provided by the patient. No  was used. Accuracy of the patient data is excellent. Cici Tate is a very pleasant 60 y.o. female seen today for follow up visit     Cici Tate was diagnosed with diabetes at age 47  and currently on: Basaglar 13 units daily, Humalog 4+ low scale     She does adjust her Basaglar 10-13 units     She did tolerate jardiance due to abdominal pain     She is checking via [employer]  Hyperglycemia 76%  Lows 1%  TIR  24%  AVG BS  242  GMI  9.1%        Lab Results   Component Value Date/Time    LABA1C 9.5 05/25/2023 08:53 AM    LABA1C 11.8 12/05/2022 10:25 AM    LABA1C 11.0 11/22/2022 08:21 AM     Patient has had no hypoglycemic episodes   She has been mindful of her diet   She eats 3 meals a day and snacks all day   I reviewed current medications and the patient has no issues with diabetes medications  The patient is up to date with eye exam and denied any h/o diabetic retinopathy. She is seeing podiatrist every 3 months and also performs her own foot care.  Last seen was last month   Microvascular complications:  No Retinopathy, Nephropathy or Neuropathy   Macrovascular complications: no CAD, PVD, or Stroke    No HX of pancreatitis  No Hx of MTC  No HX of gastroparesis   No HX of UTI/Mycotic infection       PAST MEDICAL HISTORY   Past Medical History:   Diagnosis Date    Seizures ([facility] [address]     Type II or unspecified type diabetes mellitus without mention of complication, not stated as uncontrolled     Unspecified sleep apnea     stopped after weight loss     PAST SURGICAL HISTORY   Past Surgical

## 2023-05-26 DIAGNOSIS — E11.8 TYPE 2 DIABETES MELLITUS WITH COMPLICATION (HCC): Primary | ICD-10-CM

## 2023-05-31 ENCOUNTER — TELEPHONE (OUTPATIENT)
Dept: ENDOCRINOLOGY | Age: 63
End: 2023-05-31

## 2023-06-02 ENCOUNTER — HOSPITAL ENCOUNTER (OUTPATIENT)
Dept: DIABETES SERVICES | Age: 63
Setting detail: THERAPIES SERIES
Discharge: HOME | End: 2023-06-02
Payer: COMMERCIAL

## 2023-06-02 PROCEDURE — 97802 MEDICAL NUTRITION INDIV IN: CPT

## 2023-06-02 NOTE — PROGRESS NOTES
MNT Note for Diabetes Education    Date: 6/2/2023  Patient Name: Erlin Brandt  Referring Clinician: Maxwell Jacob MD    Reason for Visit: Diabetes meal planning   History of attending DSMES: [x] Yes  [] No  Date: 2-3 years ago   History of MNT: [] Yes  [x] No   Date:    NUTRITION ASSESSMENT:      Sex: female    Age: 60 y.o. Height: 69 inches    CBW: 164 lbs  BMI: 24.22        Past Medical History:   Diagnosis Date    Seizures (HCC)     Type II or unspecified type diabetes mellitus without mention of complication, not stated as uncontrolled     Unspecified sleep apnea     stopped after weight loss       Past Surgical History:   Procedure Laterality Date    HYSTERECTOMY (CERVIX STATUS UNKNOWN)         Family History   Problem Relation Age of Onset    Thyroid Disease Mother     High Blood Pressure Mother     Arthritis Mother     High Blood Pressure Father     High Blood Pressure Sister     Diabetes Maternal Uncle     Diabetes Paternal Aunt     Diabetes Maternal Grandmother     Cancer Maternal Grandfather     Thyroid Disease Sister         Current diet  Diet Recall:  B: Oatmeal (instant) or skips   S:  L: Chicken and cheese quesadilla occasionally adds spinach   S:  D: Meat and starch, limited veggies   S: Chips, pretzels, chex mix     Her diet contains inadequate amounts of protein, inadequate amounts of healthy fats, inadequate amounts of green, leafy vegetables, and inadequate amounts of fruits. Eating food from outside the home:  4 times per week    Weight History:  [x]Increased in past yr - gained 25-30# after coming off Trulicity   []Decreased in past yr   []Stable    Appetite:  normal    Digestive concerns:  None    Fluid Intake: 48-64 oz/day  States they drink mainly: Water, sparkling water, pepsi zero sugar     Medications:  Prior to Admission medications    Medication Sig Start Date End Date Taking?  Authorizing Provider   Insulin Disposable Pump (OMNIPOD 5 G6 INTRO, GEN 5,) KIT FOR INSULIN PUMP

## 2023-08-11 ENCOUNTER — HOSPITAL ENCOUNTER (OUTPATIENT)
Dept: DIABETES SERVICES | Age: 63
Setting detail: THERAPIES SERIES
Discharge: HOME | End: 2023-08-11
Payer: COMMERCIAL

## 2023-08-11 PROCEDURE — 97804 MEDICAL NUTRITION GROUP: CPT

## 2023-08-11 NOTE — PROGRESS NOTES
day    Method of determining estimated energy requirements:  CBW x energy requirements     Obese/inactive 20 kcals/kg  >55, active women, sedentary men 25 kcals/kg  Active men, very active women   30 kcals/kg  Thin, very active men  40 kcal/kg    Planning: I will follow a nutritional plan as directed   Calorie Controlled:   1500 Calories    Implementation/Follow-up plan: Patient will increase protein and vegetables at meal and snacks while decreasing amount of carbohydrates/practice portion control. This RD will follow up with the patient to monitor progress and evaluate outcomes. Follow-up date: 8/11/23   Follow-up notes: Patient complains of hyperglycemia despite following insulin regimen. Her diet is still very high in carbohydrates, low in protein and fiber. Stress eats at work and at home (pretzels, chex mix). She is not using Omnipod insulin pump due to issues with her phone. Advised patient to reach out to pump  to schedule session. Reviewed carbohydrate counting with patient. Recommended 45-60 grams CHO/meal and 15-30 grams/snack. Encouraged high protein additions to meals and snacks to promote satiation and decrease snacking. Reviewed label reading. Patient scheduled for follow up 9/29/23.        Encounter Type Date Attended Start Time End Time Comments No Show Dates   Initial MNT 6/2/23 0730 0800       Follow-up MNT 8/11/23  0800 0900              Electronically signed by: Lu Yates RD, LD on 6/2/23 at 7:26 AM EDT

## 2023-08-21 ENCOUNTER — OFFICE VISIT (OUTPATIENT)
Dept: ENDOCRINOLOGY | Age: 63
End: 2023-08-21
Payer: COMMERCIAL

## 2023-08-21 VITALS
HEIGHT: 69 IN | HEART RATE: 66 BPM | RESPIRATION RATE: 18 BRPM | WEIGHT: 173 LBS | BODY MASS INDEX: 25.62 KG/M2 | OXYGEN SATURATION: 99 % | DIASTOLIC BLOOD PRESSURE: 64 MMHG | SYSTOLIC BLOOD PRESSURE: 174 MMHG

## 2023-08-21 DIAGNOSIS — E55.9 VITAMIN D DEFICIENCY: ICD-10-CM

## 2023-08-21 DIAGNOSIS — Z91.119 DIETARY NONCOMPLIANCE: ICD-10-CM

## 2023-08-21 DIAGNOSIS — Z83.49 FAMILY HISTORY OF THYROID DYSFUNCTION: ICD-10-CM

## 2023-08-21 DIAGNOSIS — E11.8 TYPE 2 DIABETES MELLITUS WITH COMPLICATION (HCC): Primary | ICD-10-CM

## 2023-08-21 LAB — HBA1C MFR BLD: 9.1 %

## 2023-08-21 PROCEDURE — 99214 OFFICE O/P EST MOD 30 MIN: CPT | Performed by: NURSE PRACTITIONER

## 2023-08-21 PROCEDURE — 83037 HB GLYCOSYLATED A1C HOME DEV: CPT | Performed by: NURSE PRACTITIONER

## 2023-08-21 PROCEDURE — 3046F HEMOGLOBIN A1C LEVEL >9.0%: CPT | Performed by: NURSE PRACTITIONER

## 2023-08-21 NOTE — PROGRESS NOTES
[facility] Department of Endocrinology Diabetes and Metabolism   [address] [place] [id number]   Phone: [phone]  Fax: [phone]      Date of Service: 8/21/2023  Primary Care Physician: Trina Barkley MD.  Provider: RENETTA Bahena NP         Reason for the visit:  DM type 1      History of Present Illness: The history is provided by the patient. No  was used. Accuracy of the patient data is excellent. Leni Pinto is a very pleasant 60 y.o. female seen today for follow up visit     Leni Pinto was diagnosed with diabetes at age 47  and currently on: Basaglar 13 units daily, Humalog 4+ low scale   She did not increase her insulin as directed     Sh e giver her insulin 30-60 min after eating     She is checking via [employer] [place]  Hyperglycemia 85 %  Lows 0%  TIR  15%  Avg   Gmi 9.7%   Per phone rell     Received OmniPod but awaiting training     Lab Results   Component Value Date/Time    LABA1C 9.1 08/21/2023 03:53 PM    LABA1C 9.5 05/25/2023 08:53 AM    LABA1C 11.8 12/05/2022 10:25 AM     Patient has had no hypoglycemic episodes   She has been mindful of her diet   She eats 3 meals a day and snacks all day   I reviewed current medications and the patient has no issues with diabetes medications  The patient is up to date with eye exam and denied any h/o diabetic retinopathy. She is seeing podiatrist every 3 months and also performs her own foot care.  Last seen was last month   Microvascular complications:  No Retinopathy, Nephropathy or Neuropathy   Macrovascular complications: no CAD, PVD, or Stroke    No HX of pancreatitis  No Hx of MTC  No HX of gastroparesis   No HX of UTI/Mycotic infection     Reports a lot of stress within the family    PAST MEDICAL HISTORY   Past Medical History:   Diagnosis Date    Seizures ([address])     Type II or unspecified type diabetes mellitus without mention of complication, not stated as uncontrolled

## 2023-09-13 DIAGNOSIS — E11.8 TYPE 2 DIABETES MELLITUS WITH COMPLICATION (HCC): ICD-10-CM

## 2023-09-19 RX ORDER — INSULIN LISPRO 100 [IU]/ML
INJECTION, SOLUTION INTRAVENOUS; SUBCUTANEOUS
Qty: 45 ML | Refills: 3 | Status: SHIPPED | OUTPATIENT
Start: 2023-09-19

## 2023-09-25 ENCOUNTER — TELEPHONE (OUTPATIENT)
Dept: ENDOCRINOLOGY | Age: 63
End: 2023-09-25

## 2023-09-25 NOTE — TELEPHONE ENCOUNTER
Pt called left a message stating that she would like to try something other than the [facility], she stated that upon taking it off she is getting brown marks left on her skin and she doesn't like it.

## 2023-09-28 NOTE — TELEPHONE ENCOUNTER
Phoned and spoke with pt and she stated that she hasnt stated that omnipod yet but is currently trying the [address] 3 and seems to have better luck than the 2 so far.  She stated she will keep us posted on both the quirino and omni pod (waiting for phone issues with omnipod)

## 2023-10-13 ENCOUNTER — HOSPITAL ENCOUNTER (OUTPATIENT)
Dept: DIABETES SERVICES | Age: 63
Setting detail: THERAPIES SERIES
Discharge: HOME | End: 2023-10-13
Payer: COMMERCIAL

## 2023-10-13 PROCEDURE — 97803 MED NUTRITION INDIV SUBSEQ: CPT

## 2023-10-13 NOTE — PROGRESS NOTES
MNT Note for Diabetes Education    Date: 10/13/2023  Patient Name: Clarita Zaragoza  Referring Clinician: Yogesh White MD    Reason for Visit: Diabetes meal planning   History of attending DSMES: [x] Yes  [] No  Date: 2-3 years ago   History of MNT: [] Yes  [x] No   Date:    NUTRITION ASSESSMENT:      Sex: female    Age: 60 y.o. Height: 69 inches    CBW: 164 lbs  BMI: 24.22        Past Medical History:   Diagnosis Date    Seizures (HCC)     Type II or unspecified type diabetes mellitus without mention of complication, not stated as uncontrolled     Unspecified sleep apnea     stopped after weight loss       Past Surgical History:   Procedure Laterality Date    HYSTERECTOMY (CERVIX STATUS UNKNOWN)         Family History   Problem Relation Age of Onset    Thyroid Disease Mother     High Blood Pressure Mother     Arthritis Mother     High Blood Pressure Father     High Blood Pressure Sister     Diabetes Maternal Uncle     Diabetes Paternal Aunt     Diabetes Maternal Grandmother     Cancer Maternal Grandfather     Thyroid Disease Sister         Current diet  Diet Recall:  B: Oatmeal (instant) or skips   S:  L: Chicken and cheese quesadilla occasionally adds spinach   S:  D: Meat and starch, limited veggies   S: Chips, pretzels, chex mix     Her diet contains inadequate amounts of protein, inadequate amounts of healthy fats, inadequate amounts of green, leafy vegetables, and inadequate amounts of fruits. Eating food from outside the home:  4 times per week    Weight History:  [x]Increased in past yr - gained 25-30# after coming off Trulicity   []Decreased in past yr   []Stable    Appetite:  normal    Digestive concerns:  None    Fluid Intake: 48-64 oz/day  States they drink mainly: Water, sparkling water, pepsi zero sugar     Medications:  Prior to Admission medications    Medication Sig Start Date End Date Taking?  Authorizing Provider   insulin lispro, 1 Unit Dial, (HUMALOG KWIKPEN) 100 UNIT/ML SOPN INJECT

## 2023-10-23 NOTE — PROGRESS NOTES
Basaglar 22 units daily at bedtime,  Humalog 6+ MDSS   Humalog 3 UNITS WITH A SNACK   JASON antibody > 250 and C-peptide 0.9  on 5/25/23  Await [facility] training. Advised patient to contact [employer]  for training asap. Discussed with patient A1c and blood sugar goals   Diabetes labs before next visit    Continuous Glucose Monitoring (CGM) download and interpretation   I personally reviewed and interpreted continuous glucose monitor (CGM) download. CGM report was discussed with patient including blood glucose patterns, percentages of blood glucose at goal, above goal and below goal. Insulin dosages/antidiabetic regimen was adjusted according to CGM download. Full CGM was scanned under media. Vit D deficiency   Last level 85 on  12/5/22  Replacement put on hold by PCP  Check level    Dietary noncompliance  Discussed with patient the importance of eating consistent carbohydrate meals, avoiding high glycemic index food. Also, discussed with patient the risk and negative consequences of dietary noncompliance on blood glucose control, blood pressure and weight      I personally reviewed external notes from PCP and other patient's care team providers, and personally interpreted labs associated with the above diagnosis. I also ordered labs to further assess and manage the above addressed medical conditions    Return in about 3 months (around 1/24/2024) for Type 1 DM. The above issues were reviewed with the patient who understood and agreed with the plan. Thank you for allowing us to participate in the care of this patient. Please do not hesitate to contact us with any additional questions. Diagnosis Orders   1. Type 1 diabetes mellitus with hyperglycemia (HCC)  Vitamin D 25 Hydroxy    Lipid Panel    Comprehensive Metabolic Panel    CBC with Auto Differential    Microalbumin, Ur      2. Vitamin D deficiency  Vitamin D 25 Hydroxy      3. Dietary noncompliance        4.  Type 2 diabetes mellitus with

## 2023-10-24 ENCOUNTER — OFFICE VISIT (OUTPATIENT)
Dept: ENDOCRINOLOGY | Age: 63
End: 2023-10-24
Payer: COMMERCIAL

## 2023-10-24 VITALS
DIASTOLIC BLOOD PRESSURE: 69 MMHG | HEART RATE: 72 BPM | BODY MASS INDEX: 25.99 KG/M2 | OXYGEN SATURATION: 97 % | WEIGHT: 176 LBS | SYSTOLIC BLOOD PRESSURE: 160 MMHG

## 2023-10-24 DIAGNOSIS — E11.8 TYPE 2 DIABETES MELLITUS WITH COMPLICATION (HCC): ICD-10-CM

## 2023-10-24 DIAGNOSIS — E10.65 TYPE 1 DIABETES MELLITUS WITH HYPERGLYCEMIA (HCC): Primary | ICD-10-CM

## 2023-10-24 DIAGNOSIS — E55.9 VITAMIN D DEFICIENCY: ICD-10-CM

## 2023-10-24 DIAGNOSIS — Z91.119 DIETARY NONCOMPLIANCE: ICD-10-CM

## 2023-10-24 PROCEDURE — 3046F HEMOGLOBIN A1C LEVEL >9.0%: CPT | Performed by: FAMILY MEDICINE

## 2023-10-24 PROCEDURE — 99214 OFFICE O/P EST MOD 30 MIN: CPT | Performed by: FAMILY MEDICINE

## 2023-10-24 RX ORDER — INSULIN LISPRO 100 [IU]/ML
INJECTION, SOLUTION INTRAVENOUS; SUBCUTANEOUS
Qty: 30 ML | Refills: 3 | Status: SHIPPED | OUTPATIENT
Start: 2023-10-24

## 2023-10-24 RX ORDER — INSULIN GLARGINE 100 [IU]/ML
INJECTION, SOLUTION SUBCUTANEOUS
Qty: 9 ML | Refills: 2 | Status: SHIPPED | OUTPATIENT
Start: 2023-10-24

## 2023-11-01 DIAGNOSIS — E11.8 TYPE 2 DIABETES MELLITUS WITH COMPLICATION (HCC): ICD-10-CM

## 2023-11-01 RX ORDER — PROCHLORPERAZINE 25 MG/1
SUPPOSITORY RECTAL
Qty: 1 EACH | Refills: 3 | Status: SHIPPED | OUTPATIENT
Start: 2023-11-01

## 2023-11-01 RX ORDER — PROCHLORPERAZINE 25 MG/1
SUPPOSITORY RECTAL
Qty: 3 EACH | Refills: 3 | Status: SHIPPED | OUTPATIENT
Start: 2023-11-01

## 2023-11-27 ENCOUNTER — TELEPHONE (OUTPATIENT)
Dept: ENDOCRINOLOGY | Age: 63
End: 2023-11-27

## 2023-11-27 NOTE — TELEPHONE ENCOUNTER
Pt calling to check on status of Dexcom states that [facility] needs more information and has faxed that information.

## 2023-12-15 ENCOUNTER — HOSPITAL ENCOUNTER (OUTPATIENT)
Dept: DIABETES SERVICES | Age: 63
Setting detail: THERAPIES SERIES
Discharge: HOME | End: 2023-12-15
Payer: COMMERCIAL

## 2023-12-15 PROCEDURE — 97804 MEDICAL NUTRITION GROUP: CPT

## 2023-12-15 NOTE — PROGRESS NOTES
MNT Note for Diabetes Education    Date: 12/15/2023  Patient Name: Earnest Holder  Referring Clinician: Destiny Kothari MD    Reason for Visit: Diabetes meal planning   History of attending DSMES: [x] Yes  [] No  Date: 2-3 years ago   History of MNT: [] Yes  [x] No   Date:    NUTRITION ASSESSMENT:      Sex: female    Age: 58 y.o. Height: 69 inches    CBW: 164 lbs  BMI: 24.22        Past Medical History:   Diagnosis Date    Seizures (HCC)     Type II or unspecified type diabetes mellitus without mention of complication, not stated as uncontrolled     Unspecified sleep apnea     stopped after weight loss       Past Surgical History:   Procedure Laterality Date    HYSTERECTOMY (CERVIX STATUS UNKNOWN)         Family History   Problem Relation Age of Onset    Thyroid Disease Mother     High Blood Pressure Mother     Arthritis Mother     High Blood Pressure Father     High Blood Pressure Sister     Diabetes Maternal Uncle     Diabetes Paternal Aunt     Diabetes Maternal Grandmother     Cancer Maternal Grandfather     Thyroid Disease Sister         Current diet  Diet Recall:  B: Oatmeal (instant) or skips   S:  L: Chicken and cheese quesadilla occasionally adds spinach   S:  D: Meat and starch, limited veggies   S: Chips, pretzels, chex mix     Her diet contains inadequate amounts of protein, inadequate amounts of healthy fats, inadequate amounts of green, leafy vegetables, and inadequate amounts of fruits. Eating food from outside the home:  4 times per week    Weight History:  [x]Increased in past yr - gained 25-30# after coming off Trulicity   []Decreased in past yr   []Stable    Appetite:  normal    Digestive concerns:  None    Fluid Intake: 48-64 oz/day  States they drink mainly: Water, sparkling water, pepsi zero sugar     Medications:  Prior to Admission medications    Medication Sig Start Date End Date Taking?  Authorizing Provider   Continuous Blood Gluc Transmit (DEXCOM G6 TRANSMITTER) MISC CHANGE EVERY 90

## 2024-01-27 DIAGNOSIS — E11.8 TYPE 2 DIABETES MELLITUS WITH COMPLICATION (HCC): ICD-10-CM

## 2024-01-29 ENCOUNTER — OFFICE VISIT (OUTPATIENT)
Dept: ENDOCRINOLOGY | Age: 64
End: 2024-01-29
Payer: COMMERCIAL

## 2024-01-29 VITALS
OXYGEN SATURATION: 99 % | WEIGHT: 140 LBS | DIASTOLIC BLOOD PRESSURE: 72 MMHG | SYSTOLIC BLOOD PRESSURE: 124 MMHG | HEIGHT: 69 IN | BODY MASS INDEX: 20.73 KG/M2 | RESPIRATION RATE: 18 BRPM | HEART RATE: 78 BPM

## 2024-01-29 DIAGNOSIS — E10.65 TYPE 1 DIABETES MELLITUS WITH HYPERGLYCEMIA (HCC): Primary | ICD-10-CM

## 2024-01-29 DIAGNOSIS — Z91.119 DIETARY NONCOMPLIANCE: ICD-10-CM

## 2024-01-29 DIAGNOSIS — E11.8 TYPE 2 DIABETES MELLITUS WITH COMPLICATION (HCC): ICD-10-CM

## 2024-01-29 DIAGNOSIS — E10.29 TYPE 1 DIABETES MELLITUS WITH MICROALBUMINURIA (HCC): ICD-10-CM

## 2024-01-29 DIAGNOSIS — R80.9 TYPE 1 DIABETES MELLITUS WITH MICROALBUMINURIA (HCC): ICD-10-CM

## 2024-01-29 DIAGNOSIS — E78.00 PURE HYPERCHOLESTEROLEMIA: ICD-10-CM

## 2024-01-29 PROCEDURE — 99214 OFFICE O/P EST MOD 30 MIN: CPT | Performed by: FAMILY MEDICINE

## 2024-01-29 RX ORDER — PROCHLORPERAZINE 25 MG/1
SUPPOSITORY RECTAL
Qty: 1 EACH | Refills: 0 | Status: SHIPPED
Start: 2024-01-29 | End: 2024-01-29

## 2024-01-29 RX ORDER — INSULIN GLARGINE 100 [IU]/ML
INJECTION, SOLUTION SUBCUTANEOUS
Qty: 30 ML | Refills: 3 | Status: SHIPPED | OUTPATIENT
Start: 2024-01-29

## 2024-01-29 RX ORDER — PROCHLORPERAZINE 25 MG/1
SUPPOSITORY RECTAL
Qty: 1 EACH | Refills: 3 | Status: SHIPPED | OUTPATIENT
Start: 2024-01-29

## 2024-01-29 RX ORDER — LISINOPRIL 2.5 MG/1
2.5 TABLET ORAL DAILY
Qty: 90 TABLET | Refills: 3 | Status: SHIPPED | OUTPATIENT
Start: 2024-01-29

## 2024-01-29 RX ORDER — PROCHLORPERAZINE 25 MG/1
SUPPOSITORY RECTAL
Qty: 1 EACH | Refills: 0 | Status: SHIPPED | OUTPATIENT
Start: 2024-01-29

## 2024-01-29 RX ORDER — PROCHLORPERAZINE 25 MG/1
SUPPOSITORY RECTAL
Qty: 9 EACH | Refills: 3 | Status: SHIPPED | OUTPATIENT
Start: 2024-01-29

## 2024-01-29 RX ORDER — PROCHLORPERAZINE 25 MG/1
SUPPOSITORY RECTAL
Qty: 1 EACH | Refills: 3 | Status: SHIPPED
Start: 2024-01-29 | End: 2024-01-29

## 2024-01-29 NOTE — PROGRESS NOTES
08/21/2019 07:41 AM    TRIG 46 04/27/2019 10:19 AM    TRIG 70 07/07/2018 10:51 AM    HDL 59 12/21/2023 10:33 AM    HDL 57 08/21/2019 07:41 AM    HDL 63 04/27/2019 10:19 AM    HDL 55 07/07/2018 10:51 AM     Lab Results   Component Value Date/Time    VITD25 33.6 12/21/2023 10:33 AM    VITD25 41 10/07/2021 07:40 AM    VITD25 45 09/16/2020 07:43 AM    VITD25 42 07/07/2018 10:51 AM     ASSESSMENT & RECOMMENDATIONS   Suni Garcia, a 63 y.o.-old female seen in for the following issues     Diabetes Mellitus Type 1    Patient's diabetes is uncontrolled.  Hemoglobin A1c 9.8%  Take Basaglar 15 units daily at bedtime,  Humalog 6+ MDSS   Dexcom sent to [facility] and prior auth to be started today.  JASON antibody > 250 and C-peptide 0.9  on 5/25/23  Await OMNIPOD AND DEXCOM training. Advised patient to contact [employer]  for training asap.   Discussed with patient A1c and blood sugar goals     Continuous Glucose Monitoring (CGM) download and interpretation   I personally reviewed and interpreted continuous glucose monitor (CGM) download. CGM report was discussed with patient including blood glucose patterns, percentages of blood glucose at goal, above goal and below goal. Insulin dosages/antidiabetic regimen was adjusted according to CGM download. Full CGM was scanned under media.     Hyperlipidemia  Advised low saturated fat diet and exercise  Advised optimal glucose control   If still elevated after improving glycemic control, consider statin    Microalbuminuria  Recommend starting lisinopril 5 mg p.o. daily  Advised optimal glucose control    Dietary noncompliance  Discussed with patient the importance of eating consistent carbohydrate meals, avoiding high glycemic index food. Also, discussed with patient the risk and negative consequences of dietary noncompliance on blood glucose control, blood pressure and weight      I personally reviewed external notes from PCP and other patient's care team providers, and personally

## 2024-05-01 NOTE — PROGRESS NOTES
[facility] [employer]  [facility] Department of Endocrinology Diabetes and Metabolism   [address] [place] [address]    Phone: [phone]  Fax: [phone]      Date of Service: 5/2/2024  Primary Care Physician: Arsenio Gilbert MD.  Provider: RENETTA Irvin - CNP         Reason for the visit:  DM type 1      History of Present Illness:  The history is provided by the patient. No  was used. Accuracy of the patient data is excellent.  Suni Garcia is a very pleasant 63 y.o. female seen today for follow up visit     Suni Garcia was diagnosed with diabetes at age 52  and currently on: Basaglar 10 units daily, Humalog 6 + moderate scale       She is checking via [employer], but is unable to sign into rell  Preethi reviewed on phone and most readings above goal.    TIR 17%  Hyperglycemia 83%  Hypoglycemia 0%  Avg glucose 263  GMI 10%    Received OmniPod but has not received Dexcom yet.  It was denied.  Will send Dexcom to [employer].    Lab Results   Component Value Date/Time    LABA1C 9.2 05/02/2024 07:40 AM    LABA1C 9.8 12/21/2023 10:33 AM    LABA1C 9.1 08/21/2023 03:53 PM     Patient has had no hypoglycemia  She has been mindful of her diet   She eats 3 meals a day and snacks all day   I reviewed current medications and the patient has no issues with diabetes medications  The patient is up to date with eye exam  She performs her own foot care. Insurance will not cover podiatry care.  Microvascular complications:  No Retinopathy, Nephropathy or Neuropathy   Macrovascular complications: no CAD, PVD, or Stroke    PAST MEDICAL HISTORY   Past Medical History:   Diagnosis Date    Seizures (HCC)     Type II or unspecified type diabetes mellitus without mention of complication, not stated as uncontrolled     Unspecified sleep apnea     stopped after weight loss     PAST SURGICAL HISTORY   Past Surgical History:   Procedure Laterality Date    HYSTERECTOMY (CERVIX STATUS UNKNOWN)

## 2024-05-02 ENCOUNTER — OFFICE VISIT (OUTPATIENT)
Dept: ENDOCRINOLOGY | Age: 64
End: 2024-05-02
Payer: COMMERCIAL

## 2024-05-02 VITALS
WEIGHT: 173.2 LBS | SYSTOLIC BLOOD PRESSURE: 162 MMHG | DIASTOLIC BLOOD PRESSURE: 73 MMHG | HEART RATE: 81 BPM | BODY MASS INDEX: 25.65 KG/M2 | OXYGEN SATURATION: 96 % | RESPIRATION RATE: 18 BRPM | HEIGHT: 69 IN

## 2024-05-02 DIAGNOSIS — R80.9 TYPE 1 DIABETES MELLITUS WITH MICROALBUMINURIA (HCC): ICD-10-CM

## 2024-05-02 DIAGNOSIS — Z91.119 DIETARY NONCOMPLIANCE: ICD-10-CM

## 2024-05-02 DIAGNOSIS — E10.65 TYPE 1 DIABETES MELLITUS WITH HYPERGLYCEMIA (HCC): Primary | ICD-10-CM

## 2024-05-02 DIAGNOSIS — E78.00 PURE HYPERCHOLESTEROLEMIA: ICD-10-CM

## 2024-05-02 DIAGNOSIS — E10.29 TYPE 1 DIABETES MELLITUS WITH MICROALBUMINURIA (HCC): ICD-10-CM

## 2024-05-02 LAB — HBA1C MFR BLD: 9.2 %

## 2024-05-02 PROCEDURE — 3046F HEMOGLOBIN A1C LEVEL >9.0%: CPT | Performed by: FAMILY MEDICINE

## 2024-05-02 PROCEDURE — 83036 HEMOGLOBIN GLYCOSYLATED A1C: CPT | Performed by: FAMILY MEDICINE

## 2024-05-02 PROCEDURE — 99214 OFFICE O/P EST MOD 30 MIN: CPT | Performed by: FAMILY MEDICINE

## 2024-05-02 RX ORDER — PROCHLORPERAZINE 25 MG/1
SUPPOSITORY RECTAL
Qty: 9 EACH | Refills: 3 | Status: SHIPPED | OUTPATIENT
Start: 2024-05-02

## 2024-05-02 RX ORDER — ATORVASTATIN CALCIUM 20 MG/1
20 TABLET, FILM COATED ORAL DAILY
Qty: 90 TABLET | Refills: 3 | Status: SHIPPED | OUTPATIENT
Start: 2024-05-02

## 2024-05-02 RX ORDER — PROCHLORPERAZINE 25 MG/1
SUPPOSITORY RECTAL
Qty: 1 EACH | Refills: 3 | Status: SHIPPED | OUTPATIENT
Start: 2024-05-02

## 2024-05-02 NOTE — PATIENT INSTRUCTIONS
Increase Basaglar to 12 units nightly    Increase Humalog to 8 units with meals plus scale    Recommend starting lisinopril 2.5mg daily for kidney protection.    Recommend starting atorvastatin 20mg daily for cholesterol.     Velma with [employer] will help get you training. [phone]    Omnipod intro kit  Omnipods  3. Dexcom G6 sensor  4. Dexcom G6 transmitter

## 2024-05-06 ENCOUNTER — NURSE TRIAGE (OUTPATIENT)
Dept: OTHER | Facility: CLINIC | Age: 64
End: 2024-05-06

## 2024-05-06 NOTE — TELEPHONE ENCOUNTER
Location of patient: [place]    Received call from Jessa at [facility]; Patient with Red Flag Complaint requesting to establish care with Marta .    Subjective: Caller states \"High blood pressure\"     Current Symptoms:   Patient reports her blood pressure was 213/100 on Saturday.  Measured with spouse's home cuff.  Blood pressure on Sunday was 158/98.  Denies hx of HTN  Asymptomatic    Onset: a few months ago    Pain Severity: 0/10    Temperature: Denies    What has been tried: Nothing    Recommended disposition: See in Office Within 2 Weeks  Advised walk in clinic if no available appts.    Care advice provided, patient verbalizes understanding; denies any other questions or concerns; instructed to call back for any new or worsening symptoms.    Patient/Caller agrees with recommended disposition; writer provided warm transfer to Aislinn at [facility] for appointment scheduling    Attention Provider:  Thank you for allowing me to participate in the care of your patient.  The patient was connected to triage in response to information provided to the [facility].  Please do not respond through this encounter as the response is not directed to a shared pool.    Reason for Disposition   Systolic BP >= 130 OR Diastolic >= 80, and is not taking BP medications    Protocols used: Blood Pressure - High-ADULT-[place]

## 2024-07-01 DIAGNOSIS — E11.8 TYPE 2 DIABETES MELLITUS WITH COMPLICATION (HCC): ICD-10-CM

## 2024-07-01 RX ORDER — INSULIN LISPRO 100 [IU]/ML
INJECTION, SOLUTION INTRAVENOUS; SUBCUTANEOUS
Qty: 45 ML | Refills: 0 | Status: SHIPPED
Start: 2024-07-01 | End: 2024-07-02 | Stop reason: SDUPTHER

## 2024-07-02 DIAGNOSIS — E11.8 TYPE 2 DIABETES MELLITUS WITH COMPLICATION (HCC): ICD-10-CM

## 2024-07-02 RX ORDER — INSULIN LISPRO 100 [IU]/ML
INJECTION, SOLUTION INTRAVENOUS; SUBCUTANEOUS
Qty: 45 ML | Refills: 0 | Status: SHIPPED | OUTPATIENT
Start: 2024-07-02

## 2024-07-02 RX ORDER — PEN NEEDLE, DIABETIC 32GX 5/32"
NEEDLE, DISPOSABLE MISCELLANEOUS
Qty: 400 EACH | Refills: 3 | Status: SHIPPED | OUTPATIENT
Start: 2024-07-02

## 2024-10-22 DIAGNOSIS — E11.8 TYPE 2 DIABETES MELLITUS WITH COMPLICATION (HCC): ICD-10-CM

## 2024-10-23 RX ORDER — INSULIN LISPRO 100 [IU]/ML
INJECTION, SOLUTION INTRAVENOUS; SUBCUTANEOUS
Qty: 36 ML | Refills: 1 | Status: SHIPPED | OUTPATIENT
Start: 2024-10-23

## 2024-10-28 DIAGNOSIS — E11.8 TYPE 2 DIABETES MELLITUS WITH COMPLICATION (HCC): ICD-10-CM

## 2024-10-29 ENCOUNTER — HOSPITAL ENCOUNTER (OUTPATIENT)
Age: 64
Discharge: HOME | End: 2024-10-29
Payer: COMMERCIAL

## 2024-10-29 LAB
25(OH)D3 SERPL-MCNC: 30 NG/ML (ref 30–100)
ALBUMIN SERPL-MCNC: 4.2 G/DL (ref 3.5–5.2)
ALP SERPL-CCNC: 72 U/L (ref 35–104)
ALT SERPL-CCNC: 10 U/L (ref 0–32)
ANION GAP SERPL CALCULATED.3IONS-SCNC: 10 MMOL/L (ref 7–16)
AST SERPL-CCNC: 16 U/L (ref 0–31)
BASOPHILS # BLD: 0.02 K/UL (ref 0–0.2)
BASOPHILS NFR BLD: 1 % (ref 0–2)
BILIRUB SERPL-MCNC: 0.6 MG/DL (ref 0–1.2)
BUN SERPL-MCNC: 16 MG/DL (ref 6–23)
CALCIUM SERPL-MCNC: 9.5 MG/DL (ref 8.6–10.2)
CHLORIDE SERPL-SCNC: 102 MMOL/L (ref 98–107)
CK SERPL-CCNC: 87 U/L (ref 20–180)
CO2 SERPL-SCNC: 26 MMOL/L (ref 22–29)
CREAT SERPL-MCNC: 0.8 MG/DL (ref 0.5–1)
EOSINOPHIL # BLD: 0.08 K/UL (ref 0.05–0.5)
EOSINOPHILS RELATIVE PERCENT: 2 % (ref 0–6)
ERYTHROCYTE [DISTWIDTH] IN BLOOD BY AUTOMATED COUNT: 16.9 % (ref 11.5–15)
GFR, ESTIMATED: 83 ML/MIN/1.73M2
GLUCOSE SERPL-MCNC: 245 MG/DL (ref 74–99)
HCT VFR BLD AUTO: 30.6 % (ref 34–48)
HGB BLD-MCNC: 8.9 G/DL (ref 11.5–15.5)
IMM GRANULOCYTES # BLD AUTO: <0.03 K/UL (ref 0–0.58)
IMM GRANULOCYTES NFR BLD: 0 % (ref 0–5)
LYMPHOCYTES NFR BLD: 0.97 K/UL (ref 1.5–4)
LYMPHOCYTES RELATIVE PERCENT: 27 % (ref 20–42)
MCH RBC QN AUTO: 25.1 PG (ref 26–35)
MCHC RBC AUTO-ENTMCNC: 29.1 G/DL (ref 32–34.5)
MCV RBC AUTO: 86.4 FL (ref 80–99.9)
MONOCYTES NFR BLD: 0.27 K/UL (ref 0.1–0.95)
MONOCYTES NFR BLD: 8 % (ref 2–12)
NEUTROPHILS NFR BLD: 62 % (ref 43–80)
NEUTS SEG NFR BLD: 2.21 K/UL (ref 1.8–7.3)
PLATELET # BLD AUTO: 287 K/UL (ref 130–450)
PMV BLD AUTO: 9.8 FL (ref 7–12)
POTASSIUM SERPL-SCNC: 4.2 MMOL/L (ref 3.5–5)
PROT SERPL-MCNC: 6.8 G/DL (ref 6.4–8.3)
RBC # BLD AUTO: 3.54 M/UL (ref 3.5–5.5)
SODIUM SERPL-SCNC: 138 MMOL/L (ref 132–146)
WBC OTHER # BLD: 3.6 K/UL (ref 4.5–11.5)

## 2024-10-29 PROCEDURE — 36415 COLL VENOUS BLD VENIPUNCTURE: CPT

## 2024-10-29 PROCEDURE — 80053 COMPREHEN METABOLIC PANEL: CPT

## 2024-10-29 PROCEDURE — 80177 DRUG SCRN QUAN LEVETIRACETAM: CPT

## 2024-10-29 PROCEDURE — 85025 COMPLETE CBC W/AUTO DIFF WBC: CPT

## 2024-10-29 PROCEDURE — 82550 ASSAY OF CK (CPK): CPT

## 2024-10-29 PROCEDURE — 82306 VITAMIN D 25 HYDROXY: CPT

## 2024-10-29 RX ORDER — PEN NEEDLE, DIABETIC 32GX 5/32"
NEEDLE, DISPOSABLE MISCELLANEOUS
Qty: 400 EACH | Refills: 4 | Status: SHIPPED | OUTPATIENT
Start: 2024-10-29

## 2024-10-30 LAB — LEVETIRACETAM SERPL-MCNC: 55 UG/ML

## 2024-10-30 RX ORDER — INSULIN LISPRO 100 [IU]/ML
INJECTION, SOLUTION INTRAVENOUS; SUBCUTANEOUS
Qty: 36 ML | Refills: 1 | Status: SHIPPED | OUTPATIENT
Start: 2024-10-30

## 2024-12-03 RX ORDER — LISINOPRIL 2.5 MG/1
2.5 TABLET ORAL DAILY
Qty: 90 TABLET | Refills: 1 | Status: SHIPPED | OUTPATIENT
Start: 2024-12-03

## 2025-02-04 ENCOUNTER — OFFICE VISIT (OUTPATIENT)
Dept: ENDOCRINOLOGY | Age: 65
End: 2025-02-04
Payer: MEDICARE

## 2025-02-04 VITALS
HEIGHT: 69 IN | WEIGHT: 174 LBS | SYSTOLIC BLOOD PRESSURE: 122 MMHG | BODY MASS INDEX: 25.77 KG/M2 | DIASTOLIC BLOOD PRESSURE: 78 MMHG

## 2025-02-04 DIAGNOSIS — R80.9 TYPE 1 DIABETES MELLITUS WITH MICROALBUMINURIA (HCC): ICD-10-CM

## 2025-02-04 DIAGNOSIS — E10.29 TYPE 1 DIABETES MELLITUS WITH MICROALBUMINURIA (HCC): ICD-10-CM

## 2025-02-04 DIAGNOSIS — Z91.119 DIETARY NONCOMPLIANCE: ICD-10-CM

## 2025-02-04 DIAGNOSIS — E78.00 PURE HYPERCHOLESTEROLEMIA: ICD-10-CM

## 2025-02-04 DIAGNOSIS — E10.65 TYPE 1 DIABETES MELLITUS WITH HYPERGLYCEMIA (HCC): Primary | ICD-10-CM

## 2025-02-04 LAB — HBA1C MFR BLD: 9.6 %

## 2025-02-04 PROCEDURE — 3017F COLORECTAL CA SCREEN DOC REV: CPT | Performed by: NURSE PRACTITIONER

## 2025-02-04 PROCEDURE — 1090F PRES/ABSN URINE INCON ASSESS: CPT | Performed by: NURSE PRACTITIONER

## 2025-02-04 PROCEDURE — 99214 OFFICE O/P EST MOD 30 MIN: CPT | Performed by: NURSE PRACTITIONER

## 2025-02-04 PROCEDURE — 1123F ACP DISCUSS/DSCN MKR DOCD: CPT | Performed by: NURSE PRACTITIONER

## 2025-02-04 PROCEDURE — 2022F DILAT RTA XM EVC RTNOPTHY: CPT | Performed by: NURSE PRACTITIONER

## 2025-02-04 PROCEDURE — G8400 PT W/DXA NO RESULTS DOC: HCPCS | Performed by: NURSE PRACTITIONER

## 2025-02-04 PROCEDURE — G8419 CALC BMI OUT NRM PARAM NOF/U: HCPCS | Performed by: NURSE PRACTITIONER

## 2025-02-04 PROCEDURE — G8427 DOCREV CUR MEDS BY ELIG CLIN: HCPCS | Performed by: NURSE PRACTITIONER

## 2025-02-04 PROCEDURE — 3046F HEMOGLOBIN A1C LEVEL >9.0%: CPT | Performed by: NURSE PRACTITIONER

## 2025-02-04 PROCEDURE — 83036 HEMOGLOBIN GLYCOSYLATED A1C: CPT | Performed by: NURSE PRACTITIONER

## 2025-02-04 PROCEDURE — 1036F TOBACCO NON-USER: CPT | Performed by: NURSE PRACTITIONER

## 2025-02-04 RX ORDER — PROCHLORPERAZINE 25 MG/1
SUPPOSITORY RECTAL
Qty: 1 EACH | Refills: 3 | Status: SHIPPED | OUTPATIENT
Start: 2025-02-04

## 2025-02-04 RX ORDER — LISINOPRIL 2.5 MG/1
2.5 TABLET ORAL DAILY
Qty: 90 TABLET | Refills: 1 | Status: SHIPPED | OUTPATIENT
Start: 2025-02-04

## 2025-02-04 RX ORDER — PROCHLORPERAZINE 25 MG/1
SUPPOSITORY RECTAL
Qty: 9 EACH | Refills: 3 | Status: SHIPPED | OUTPATIENT
Start: 2025-02-04

## 2025-02-04 RX ORDER — PROCHLORPERAZINE 25 MG/1
SUPPOSITORY RECTAL
Qty: 1 EACH | Refills: 0 | Status: SHIPPED | OUTPATIENT
Start: 2025-02-04

## 2025-02-04 RX ORDER — INSULIN GLARGINE 100 [IU]/ML
INJECTION, SOLUTION SUBCUTANEOUS
Qty: 30 ML | Refills: 3
Start: 2025-02-04

## 2025-02-04 RX ORDER — INSULIN LISPRO 100 [IU]/ML
INJECTION, SOLUTION INTRAVENOUS; SUBCUTANEOUS
Qty: 45 ML | Refills: 3 | OUTPATIENT
Start: 2025-02-04

## 2025-02-04 NOTE — PATIENT INSTRUCTIONS
Basaglar 12 units daily at bedtime  Humalog 7+ scale at meals  Check sugar , give shot, eat  Dexcom reordered

## 2025-02-04 NOTE — PROGRESS NOTES
[facility] [employer]  [facility] Department of Endocrinology Diabetes and Metabolism   [address] [place] [address]    Phone: [phone]  Fax: [phone]      Date of Service: 2/5/2025  Primary Care Physician: Arsenio Gilbert MD.  Provider: RENETTA Gottlieb NP         Reason for the visit:  DM type 1      History of Present Illness:  The history is provided by the patient. No  was used. Accuracy of the patient data is excellent.  Suni Garcia is a very pleasant 64 y.o. female seen today for follow up visit     Last OV  5/2024    Suni Garcia was diagnosed with diabetes at age 52  and currently on: Basaglar 9 units daily, Humalog 6 + moderate scale   Has been holding her   Humalog if her BS  < 151    No longer wearing Preethi due to insurance change   Received Omnipod but did not receive Dexcom, now on Medicare      Has been completing fingersticks     BS in 's   Before meals 180's     Lab Results   Component Value Date/Time    LABA1C 9.6 02/04/2025 04:07 PM    LABA1C 9.2 05/02/2024 07:40 AM    LABA1C 9.8 12/21/2023 10:33 AM     Patient has had no hypoglycemia  She has been mindful of her diet   She eats 3 meals a day and snacks all day   I reviewed current medications and the patient has no issues with diabetes medications  The patient is up to date with eye exam  She performs her own foot care. Insurance will not cover podiatry care.  Microvascular complications:  No Retinopathy, Nephropathy or Neuropathy   Macrovascular complications: no CAD, PVD, or Stroke    PAST MEDICAL HISTORY   Past Medical History:   Diagnosis Date    Seizures (HCC)     Type II or unspecified type diabetes mellitus without mention of complication, not stated as uncontrolled     Unspecified sleep apnea     stopped after weight loss     PAST SURGICAL HISTORY   Past Surgical History:   Procedure Laterality Date    HYSTERECTOMY (CERVIX STATUS UNKNOWN)       SOCIAL HISTORY

## 2025-02-24 RX ORDER — INSULIN LISPRO 100 [IU]/ML
INJECTION, SOLUTION INTRAVENOUS; SUBCUTANEOUS
Qty: 36 ML | Refills: 3 | Status: SHIPPED | OUTPATIENT
Start: 2025-02-24

## 2025-03-14 DIAGNOSIS — E10.65 TYPE 1 DIABETES MELLITUS WITH HYPERGLYCEMIA (HCC): ICD-10-CM

## 2025-03-17 RX ORDER — INSULIN GLARGINE 100 [IU]/ML
INJECTION, SOLUTION SUBCUTANEOUS
Qty: 15 ML | Refills: 1 | Status: SHIPPED | OUTPATIENT
Start: 2025-03-17

## 2025-05-19 ENCOUNTER — OFFICE VISIT (OUTPATIENT)
Dept: ENDOCRINOLOGY | Age: 65
End: 2025-05-19
Payer: MEDICARE

## 2025-05-19 VITALS
WEIGHT: 183 LBS | DIASTOLIC BLOOD PRESSURE: 60 MMHG | RESPIRATION RATE: 18 BRPM | SYSTOLIC BLOOD PRESSURE: 114 MMHG | BODY MASS INDEX: 27.11 KG/M2 | HEART RATE: 76 BPM | OXYGEN SATURATION: 99 % | TEMPERATURE: 97.7 F | HEIGHT: 69 IN

## 2025-05-19 DIAGNOSIS — E11.8 TYPE 2 DIABETES MELLITUS WITH COMPLICATION (HCC): ICD-10-CM

## 2025-05-19 DIAGNOSIS — E10.29 TYPE 1 DIABETES MELLITUS WITH MICROALBUMINURIA (HCC): ICD-10-CM

## 2025-05-19 DIAGNOSIS — Z91.119 DIETARY NONCOMPLIANCE: ICD-10-CM

## 2025-05-19 DIAGNOSIS — E10.65 TYPE 1 DIABETES MELLITUS WITH HYPERGLYCEMIA (HCC): Primary | ICD-10-CM

## 2025-05-19 DIAGNOSIS — E78.00 PURE HYPERCHOLESTEROLEMIA: ICD-10-CM

## 2025-05-19 DIAGNOSIS — R80.9 TYPE 1 DIABETES MELLITUS WITH MICROALBUMINURIA (HCC): ICD-10-CM

## 2025-05-19 LAB — HBA1C MFR BLD: 7.7 %

## 2025-05-19 PROCEDURE — G8427 DOCREV CUR MEDS BY ELIG CLIN: HCPCS | Performed by: NURSE PRACTITIONER

## 2025-05-19 PROCEDURE — 99214 OFFICE O/P EST MOD 30 MIN: CPT | Performed by: NURSE PRACTITIONER

## 2025-05-19 PROCEDURE — G8400 PT W/DXA NO RESULTS DOC: HCPCS | Performed by: NURSE PRACTITIONER

## 2025-05-19 PROCEDURE — 1036F TOBACCO NON-USER: CPT | Performed by: NURSE PRACTITIONER

## 2025-05-19 PROCEDURE — 2022F DILAT RTA XM EVC RTNOPTHY: CPT | Performed by: NURSE PRACTITIONER

## 2025-05-19 PROCEDURE — 95251 CONT GLUC MNTR ANALYSIS I&R: CPT | Performed by: NURSE PRACTITIONER

## 2025-05-19 PROCEDURE — 1123F ACP DISCUSS/DSCN MKR DOCD: CPT | Performed by: NURSE PRACTITIONER

## 2025-05-19 PROCEDURE — 3051F HG A1C>EQUAL 7.0%<8.0%: CPT | Performed by: NURSE PRACTITIONER

## 2025-05-19 PROCEDURE — 1090F PRES/ABSN URINE INCON ASSESS: CPT | Performed by: NURSE PRACTITIONER

## 2025-05-19 PROCEDURE — 3017F COLORECTAL CA SCREEN DOC REV: CPT | Performed by: NURSE PRACTITIONER

## 2025-05-19 PROCEDURE — 83036 HEMOGLOBIN GLYCOSYLATED A1C: CPT | Performed by: NURSE PRACTITIONER

## 2025-05-19 PROCEDURE — G8419 CALC BMI OUT NRM PARAM NOF/U: HCPCS | Performed by: NURSE PRACTITIONER

## 2025-05-19 RX ORDER — INSULIN PMP CART,AUT,G6/7,CNTR
EACH SUBCUTANEOUS
Qty: 10 EACH | Refills: 3 | Status: SHIPPED | OUTPATIENT
Start: 2025-05-19

## 2025-05-19 RX ORDER — PROCHLORPERAZINE 25 MG/1
SUPPOSITORY RECTAL
Qty: 3 EACH | Refills: 3 | Status: SHIPPED | OUTPATIENT
Start: 2025-05-19

## 2025-05-19 NOTE — PROGRESS NOTES
Diabetes Mellitus Type 1    Patient's diabetes is uncontrolled.  Hemoglobin A1c 9.2%-> 9.%-> 7.7% since starting insulin pump  Adjust regimen:   Basal  1.00, cr  13 isf  41 bs gaol 110-110, ait 2   JASON antibody > 250 and C-peptide 0.9  on 5/25/23  Once Dexcom received, will need to reach out to [facility] to assist in scheduling training  Discussed with patient A1c and blood sugar goals     Continuous Glucose Monitoring (CGM) download and interpretation   I personally reviewed and interpreted continuous glucose monitor (CGM) download. CGM report was discussed with patient including blood glucose patterns, percentages of blood glucose at goal, above goal and below goal. Insulin dosages/antidiabetic regimen was adjusted according to CGM download. Full CGM was scanned under media.     Hyperlipidemia  Recommend starting statin atorvastatin 20 mg daily  Advised low saturated fat diet and exercise  Advised optimal glucose control       Microalbuminuria  Recommended starting ACE inhibitor for renal protection  lisinopril 2.5 mg daily.   Advised optimal glucose control    Dietary noncompliance  Discussed with patient the importance of eating consistent carbohydrate meals, avoiding high glycemic index food. Also, discussed with patient the risk and negative consequences of dietary noncompliance on blood glucose control, blood pressure and weight      I personally reviewed external notes from PCP and other patient's care team providers, and personally interpreted labs associated with the above diagnosis. I also ordered labs to further assess and manage the above addressed medical conditions    No follow-ups on file.     The above issues were reviewed with the patient who understood and agreed with the plan.  Thank you for allowing us to participate in the care of this patient. Please do not hesitate to contact us with any additional questions.    Diagnosis Orders   1. Type 1 diabetes mellitus with hyperglycemia (HCC)

## 2025-06-03 ENCOUNTER — TELEPHONE (OUTPATIENT)
Dept: ENDOCRINOLOGY | Age: 65
End: 2025-06-03

## 2025-06-03 NOTE — TELEPHONE ENCOUNTER
Patient called office would like you to review Glooko. States sugars are going low. Glooko given to you.

## 2025-06-05 DIAGNOSIS — E10.65 TYPE 1 DIABETES MELLITUS WITH HYPERGLYCEMIA (HCC): ICD-10-CM

## 2025-06-05 RX ORDER — PROCHLORPERAZINE 25 MG/1
SUPPOSITORY RECTAL
Qty: 1 EACH | Refills: 3 | Status: SHIPPED | OUTPATIENT
Start: 2025-06-05

## 2025-08-01 ENCOUNTER — APPOINTMENT (OUTPATIENT)
Dept: CT IMAGING | Age: 65
End: 2025-08-01
Payer: COMMERCIAL

## 2025-08-01 ENCOUNTER — HOSPITAL ENCOUNTER (EMERGENCY)
Age: 65
Discharge: HOME | End: 2025-08-01
Attending: EMERGENCY MEDICINE
Payer: COMMERCIAL

## 2025-08-01 VITALS
OXYGEN SATURATION: 98 % | WEIGHT: 186 LBS | RESPIRATION RATE: 16 BRPM | SYSTOLIC BLOOD PRESSURE: 198 MMHG | DIASTOLIC BLOOD PRESSURE: 54 MMHG | BODY MASS INDEX: 27.47 KG/M2 | TEMPERATURE: 98.4 F | HEART RATE: 62 BPM

## 2025-08-01 DIAGNOSIS — I10 ESSENTIAL HYPERTENSION: ICD-10-CM

## 2025-08-01 DIAGNOSIS — D64.9 ANEMIA, UNSPECIFIED TYPE: Primary | ICD-10-CM

## 2025-08-01 DIAGNOSIS — L03.211 FACIAL CELLULITIS: ICD-10-CM

## 2025-08-01 LAB
ALBUMIN SERPL-MCNC: 4.1 G/DL (ref 3.5–5.2)
ALP SERPL-CCNC: 64 U/L (ref 35–104)
ALT SERPL-CCNC: 8 U/L (ref 0–35)
ANION GAP SERPL CALCULATED.3IONS-SCNC: 10 MMOL/L (ref 7–16)
AST SERPL-CCNC: 19 U/L (ref 0–35)
BASOPHILS # BLD: 0.02 K/UL (ref 0–0.2)
BASOPHILS NFR BLD: 0 % (ref 0–2)
BILIRUB SERPL-MCNC: 0.9 MG/DL (ref 0–1.2)
BUN SERPL-MCNC: 10 MG/DL (ref 8–23)
CALCIUM SERPL-MCNC: 9.3 MG/DL (ref 8.8–10.2)
CHLORIDE SERPL-SCNC: 104 MMOL/L (ref 98–107)
CO2 SERPL-SCNC: 26 MMOL/L (ref 22–29)
CREAT SERPL-MCNC: 0.9 MG/DL (ref 0.5–1)
EOSINOPHIL # BLD: 0.02 K/UL (ref 0.05–0.5)
EOSINOPHILS RELATIVE PERCENT: 0 % (ref 0–6)
ERYTHROCYTE [DISTWIDTH] IN BLOOD BY AUTOMATED COUNT: 17.2 % (ref 11.5–15)
GFR, ESTIMATED: 75 ML/MIN/1.73M2
GLUCOSE SERPL-MCNC: 127 MG/DL (ref 74–99)
HCT VFR BLD AUTO: 30.1 % (ref 34–48)
HGB BLD-MCNC: 9 G/DL (ref 11.5–15.5)
IMM GRANULOCYTES # BLD AUTO: <0.03 K/UL (ref 0–0.58)
IMM GRANULOCYTES NFR BLD: 0 % (ref 0–5)
LYMPHOCYTES NFR BLD: 1.06 K/UL (ref 1.5–4)
LYMPHOCYTES RELATIVE PERCENT: 23 % (ref 20–42)
MCH RBC QN AUTO: 25.4 PG (ref 26–35)
MCHC RBC AUTO-ENTMCNC: 29.9 G/DL (ref 32–34.5)
MCV RBC AUTO: 85 FL (ref 80–99.9)
MONOCYTES NFR BLD: 0.31 K/UL (ref 0.1–0.95)
MONOCYTES NFR BLD: 7 % (ref 2–12)
NEUTROPHILS NFR BLD: 69 % (ref 43–80)
NEUTS SEG NFR BLD: 3.23 K/UL (ref 1.8–7.3)
PLATELET # BLD AUTO: 317 K/UL (ref 130–450)
PMV BLD AUTO: 9.6 FL (ref 7–12)
POTASSIUM SERPL-SCNC: 4.2 MMOL/L (ref 3.5–5.1)
PROT SERPL-MCNC: 7.1 G/DL (ref 6.4–8.3)
RBC # BLD AUTO: 3.54 M/UL (ref 3.5–5.5)
SODIUM SERPL-SCNC: 140 MMOL/L (ref 136–145)
WBC OTHER # BLD: 4.7 K/UL (ref 4.5–11.5)

## 2025-08-01 PROCEDURE — 6360000002 HC RX W HCPCS: Performed by: EMERGENCY MEDICINE

## 2025-08-01 PROCEDURE — 99285 EMERGENCY DEPT VISIT HI MDM: CPT

## 2025-08-01 PROCEDURE — 96375 TX/PRO/DX INJ NEW DRUG ADDON: CPT

## 2025-08-01 PROCEDURE — 70491 CT SOFT TISSUE NECK W/DYE: CPT

## 2025-08-01 PROCEDURE — 96365 THER/PROPH/DIAG IV INF INIT: CPT

## 2025-08-01 PROCEDURE — 2500000003 HC RX 250 WO HCPCS: Performed by: EMERGENCY MEDICINE

## 2025-08-01 PROCEDURE — 85025 COMPLETE CBC W/AUTO DIFF WBC: CPT

## 2025-08-01 PROCEDURE — 6360000004 HC RX CONTRAST MEDICATION: Performed by: RADIOLOGY

## 2025-08-01 PROCEDURE — 80053 COMPREHEN METABOLIC PANEL: CPT

## 2025-08-01 PROCEDURE — 6370000000 HC RX 637 (ALT 250 FOR IP): Performed by: EMERGENCY MEDICINE

## 2025-08-01 PROCEDURE — 2580000003 HC RX 258: Performed by: EMERGENCY MEDICINE

## 2025-08-01 RX ORDER — IOPAMIDOL 755 MG/ML
75 INJECTION, SOLUTION INTRAVASCULAR
Status: COMPLETED | OUTPATIENT
Start: 2025-08-01 | End: 2025-08-01

## 2025-08-01 RX ORDER — CETIRIZINE HYDROCHLORIDE 10 MG/1
10 TABLET ORAL DAILY
Status: DISCONTINUED | OUTPATIENT
Start: 2025-08-01 | End: 2025-08-01 | Stop reason: HOSPADM

## 2025-08-01 RX ORDER — 0.9 % SODIUM CHLORIDE 0.9 %
1000 INTRAVENOUS SOLUTION INTRAVENOUS ONCE
Status: COMPLETED | OUTPATIENT
Start: 2025-08-01 | End: 2025-08-01

## 2025-08-01 RX ADMIN — WATER 125 MG: 1 INJECTION INTRAMUSCULAR; INTRAVENOUS; SUBCUTANEOUS at 08:04

## 2025-08-01 RX ADMIN — SODIUM CHLORIDE 3000 MG: 9 INJECTION, SOLUTION INTRAVENOUS at 09:47

## 2025-08-01 RX ADMIN — CETIRIZINE HYDROCHLORIDE 10 MG: 10 TABLET, FILM COATED ORAL at 07:59

## 2025-08-01 RX ADMIN — SODIUM CHLORIDE 1000 ML: 0.9 INJECTION, SOLUTION INTRAVENOUS at 08:04

## 2025-08-01 RX ADMIN — IOPAMIDOL 75 ML: 755 INJECTION, SOLUTION INTRAVENOUS at 08:52

## 2025-08-01 ASSESSMENT — PAIN - FUNCTIONAL ASSESSMENT: PAIN_FUNCTIONAL_ASSESSMENT: NONE - DENIES PAIN

## 2025-08-01 NOTE — ED PROVIDER NOTES
Making/Differential Diagnosis:    CC/HPI Summary, Social Determinants of health, Records Reviewed, DDx, testing done/not done, ED Course, Reassessment, disposition considerations/shared decision making with patient, consults, disposition:            MDM:   Left facial cellulitis.  Unlikely to be allergic reaction given history and physical.  Treated with IV Solu-Medrol, oral Zyrtec in ER without improvement.  After CAT scan results with infectious appearing source, IV Unasyn was provided.  Will discharge home on oral Augmentin.  Follow-up closely with PCP.  Could still be of odontogenic origin.  Patient did take blood pressure medicine this morning, she is uncomfortable, likely resulting in blood pressure elevation today.  Patient is asymptomatic of hypertension at this time.      Patient appears well, nontoxic, neurovascularly intact and ambulatory upon discharge.  Patient was explicitly instructed on specific signs and symptoms on which to return to the emergency room for. Patient was instructed to return to the ER for any new or worsening symptoms. Additional discharge instructions were given verbally. All questions were answered. Patient is comfortable and agreeable with discharge plan. Patient in no acute distress and non-toxic in appearance.     Shared decision making was used to determine testing, treatments and disposition.    Re-Evaluations:   Re-evaluation.  Patient’s symptoms are improving  Repeat physical examination is not changed    Counseling:  The emergency provider has spoken with the patient and discussed today’s results, in addition to providing specific details for the plan of care and counseling regarding the diagnosis and prognosis.  Questions are answered at this time and they are agreeable with the plan.    I am the Primary Clinician of Record.     --------------------------------- IMPRESSION AND DISPOSITION ---------------------------------    IMPRESSION  1. Anemia, unspecified type    2.

## 2025-08-11 ENCOUNTER — OFFICE VISIT (OUTPATIENT)
Dept: ENDOCRINOLOGY | Age: 65
End: 2025-08-11
Payer: COMMERCIAL

## 2025-08-11 VITALS
BODY MASS INDEX: 26.96 KG/M2 | DIASTOLIC BLOOD PRESSURE: 50 MMHG | SYSTOLIC BLOOD PRESSURE: 95 MMHG | WEIGHT: 182 LBS | OXYGEN SATURATION: 99 % | HEIGHT: 69 IN | TEMPERATURE: 97.7 F | HEART RATE: 84 BPM | RESPIRATION RATE: 18 BRPM

## 2025-08-11 DIAGNOSIS — E10.29 TYPE 1 DIABETES MELLITUS WITH MICROALBUMINURIA (HCC): ICD-10-CM

## 2025-08-11 DIAGNOSIS — Z91.119 DIETARY NONCOMPLIANCE: ICD-10-CM

## 2025-08-11 DIAGNOSIS — R80.9 TYPE 1 DIABETES MELLITUS WITH MICROALBUMINURIA (HCC): ICD-10-CM

## 2025-08-11 DIAGNOSIS — E10.65 TYPE 1 DIABETES MELLITUS WITH HYPERGLYCEMIA (HCC): Primary | ICD-10-CM

## 2025-08-11 DIAGNOSIS — E78.00 PURE HYPERCHOLESTEROLEMIA: ICD-10-CM

## 2025-08-11 LAB
ESTIMATED AVERAGE GLUCOSE: NORMAL
HBA1C MFR BLD: 7.6 %

## 2025-08-11 PROCEDURE — 3051F HG A1C>EQUAL 7.0%<8.0%: CPT | Performed by: NURSE PRACTITIONER

## 2025-08-11 PROCEDURE — 95251 CONT GLUC MNTR ANALYSIS I&R: CPT | Performed by: NURSE PRACTITIONER

## 2025-08-11 PROCEDURE — 99214 OFFICE O/P EST MOD 30 MIN: CPT | Performed by: NURSE PRACTITIONER

## 2025-08-11 PROCEDURE — 1123F ACP DISCUSS/DSCN MKR DOCD: CPT | Performed by: NURSE PRACTITIONER

## 2025-08-11 RX ORDER — INSULIN LISPRO 100 [IU]/ML
INJECTION, SOLUTION INTRAVENOUS; SUBCUTANEOUS
Qty: 90 ML | Refills: 3 | Status: SHIPPED | OUTPATIENT
Start: 2025-08-11

## 2025-08-11 RX ORDER — PROCHLORPERAZINE 25 MG/1
SUPPOSITORY RECTAL
Qty: 9 EACH | Refills: 3 | Status: SHIPPED | OUTPATIENT
Start: 2025-08-11

## 2025-08-11 RX ORDER — PROCHLORPERAZINE 25 MG/1
SUPPOSITORY RECTAL
Qty: 9 EACH | Refills: 3 | Status: SHIPPED | OUTPATIENT
Start: 2025-08-11 | End: 2025-08-11

## 2025-08-23 DIAGNOSIS — E10.65 TYPE 1 DIABETES MELLITUS WITH HYPERGLYCEMIA (HCC): ICD-10-CM

## 2025-08-26 DIAGNOSIS — E10.65 TYPE 1 DIABETES MELLITUS WITH HYPERGLYCEMIA (HCC): ICD-10-CM

## 2025-08-26 RX ORDER — LISINOPRIL 2.5 MG/1
2.5 TABLET ORAL DAILY
Qty: 90 TABLET | Refills: 3 | Status: SHIPPED | OUTPATIENT
Start: 2025-08-26

## 2025-08-26 RX ORDER — LISINOPRIL 2.5 MG/1
2.5 TABLET ORAL DAILY
Qty: 90 TABLET | Refills: 3 | OUTPATIENT
Start: 2025-08-26